# Patient Record
Sex: MALE | Race: WHITE | NOT HISPANIC OR LATINO | Employment: OTHER | URBAN - METROPOLITAN AREA
[De-identification: names, ages, dates, MRNs, and addresses within clinical notes are randomized per-mention and may not be internally consistent; named-entity substitution may affect disease eponyms.]

---

## 2017-01-30 ENCOUNTER — TELEPHONE (OUTPATIENT)
Dept: FAMILY MEDICINE | Facility: CLINIC | Age: 77
End: 2017-01-30

## 2017-01-30 NOTE — TELEPHONE ENCOUNTER
Reason for call:  Patient reporting a symptom    Symptom or request: Chest pressure    Duration (how long have symptoms been present): 2 or 3 days    Have you been treated for this before? No    Additional comments: Call routed to RN for triaging.    Phone Number patient can be reached at:  Home number on file [phone] (home)    Best Time:  na    Can we leave a detailed message on this number:  Not Applicable    Call taken on 1/30/2017 at 12:48 PM by Yas Bentley

## 2017-01-30 NOTE — TELEPHONE ENCOUNTER
Kilo Joya is a 77 year old male who calls with chest pain/pressure.   Denies cough, denies fever, denies SOB.  Low energy and has some swelling in legs but this is per baseline, patient does wear compression stockings.      NURSING ASSESSMENT:  Description:  Chest pain   Onset/duration:   2-3 days   Precip. factors:   Associated symptoms:  More tired- patient states he feels sick but is not couging    Improves/worsens symptoms:  no  Pain scale (0-10)  Ache   Last exam/Treatment:  3/8/16  Allergies:   Allergies   Allergen Reactions     Sulfa Drugs Hives and Swelling       MEDICATIONS:   Taking medication(s) as prescribed? Yes  Taking over the counter medication(s?) No  Any medication side effects? No significant side effects    Any barriers to taking medication(s) as prescribed?  No  Medication(s) improving/managing symptoms?  No  Medication reconciliation completed: Yes      NURSING PLAN: Nursing advice to patient come to clinic for appointment     RECOMMENDED DISPOSITION:  See in 24 hours - patient scheduled for 1/31/17 with pcp   Will comply with recommendation: Yes  If further questions/concerns or if symptoms do not improve, worsen or new symptoms develop, call your PCP or [facility] as soon as possible.      Guideline used:  Telephone Triage Protocols for Nurses, Fourth Edition, Dianne Enriquez RN

## 2017-01-31 ENCOUNTER — OFFICE VISIT (OUTPATIENT)
Dept: FAMILY MEDICINE | Facility: CLINIC | Age: 77
End: 2017-01-31
Payer: MEDICARE

## 2017-01-31 VITALS
DIASTOLIC BLOOD PRESSURE: 68 MMHG | HEART RATE: 82 BPM | RESPIRATION RATE: 16 BRPM | TEMPERATURE: 98 F | OXYGEN SATURATION: 99 % | SYSTOLIC BLOOD PRESSURE: 106 MMHG | BODY MASS INDEX: 27.62 KG/M2 | HEIGHT: 67 IN | WEIGHT: 176 LBS

## 2017-01-31 DIAGNOSIS — Z23 NEED FOR PROPHYLACTIC VACCINATION WITH TETANUS-DIPHTHERIA (TD): ICD-10-CM

## 2017-01-31 DIAGNOSIS — Z23 NEED FOR PROPHYLACTIC VACCINATION AGAINST STREPTOCOCCUS PNEUMONIAE (PNEUMOCOCCUS): ICD-10-CM

## 2017-01-31 DIAGNOSIS — Z23 NEED FOR PROPHYLACTIC VACCINATION AND INOCULATION AGAINST INFLUENZA: ICD-10-CM

## 2017-01-31 DIAGNOSIS — E78.5 HYPERLIPIDEMIA WITH TARGET LDL LESS THAN 100: ICD-10-CM

## 2017-01-31 DIAGNOSIS — Z91.89 FRAMINGHAM CARDIAC RISK 10-20% IN NEXT 10 YEARS: ICD-10-CM

## 2017-01-31 DIAGNOSIS — R07.89 ATYPICAL CHEST PAIN: Primary | ICD-10-CM

## 2017-01-31 PROCEDURE — 90471 IMMUNIZATION ADMIN: CPT | Performed by: INTERNAL MEDICINE

## 2017-01-31 PROCEDURE — 99214 OFFICE O/P EST MOD 30 MIN: CPT | Mod: 25 | Performed by: INTERNAL MEDICINE

## 2017-01-31 PROCEDURE — 93000 ELECTROCARDIOGRAM COMPLETE: CPT | Performed by: INTERNAL MEDICINE

## 2017-01-31 PROCEDURE — G0009 ADMIN PNEUMOCOCCAL VACCINE: HCPCS | Mod: 59 | Performed by: INTERNAL MEDICINE

## 2017-01-31 PROCEDURE — 90715 TDAP VACCINE 7 YRS/> IM: CPT | Mod: GY | Performed by: INTERNAL MEDICINE

## 2017-01-31 PROCEDURE — 90670 PCV13 VACCINE IM: CPT | Performed by: INTERNAL MEDICINE

## 2017-01-31 ASSESSMENT — PAIN SCALES - GENERAL: PAINLEVEL: MILD PAIN (2)

## 2017-01-31 NOTE — PROGRESS NOTES
SUBJECTIVE:                                                    Kilo Joya is a 77 year old male who presents to clinic today for the following health issues:       Chest pain  Need for prophylactic vaccination and inoculation against influenza  Need for prophylactic vaccination against Streptococcus pneumoniae (pneumococcus)  Need for prophylactic vaccination with tetanus-diphtheria (TD)     Mr. Kilo Joya is a patient I met once before. He's a very pleasant gentleman here with his spouse who is fighting a cancer and treatment and they are not able to travel to [place] for this winter as they have in years past. Patient has had roughly 10 days of a smouldering chest tightness which is a constant pain / chest pressure  ,no relationship to physical activity and no anginal features such as arms jaw, back or neck symptoms. No shortness of breath or clamminess and sweatiness . His symptoms are centered in the epigastrium and are strongly suggestive of gastrointestinal-mediated non-cardiac chest pain. To top it all off, he concedes today that he had been forgetting to take his ranitidine [Zantac] and got back onto this medication over the last few days and his chest pain symptoms are now subsiding.    GERD/Heartburn      Duration: 1 week    Description (location/character/radiation): epigastric    Intensity:  moderate    Accompanying signs and symptoms:  food getting stuck: no   nausea/vomiting/blood: no   abdominal pain: no   black/tarry or bloody stools: no : green stools    History (similar episodes/previous evaluation): throat pain    Precipitating or alleviating factors:  worse with no particular food or drink.  current NSAID/Aspirin use: YES- 81 mg    Therapies tried and outcome: Zantac (Ranitidine)     He has a high framingham risk index score and was treated for this with atorvastatin (Lipitor) but then stopped this medication because of what he heard from his friends about atorvastatin (Lipitor)  having caused diabetes mellitus. We reviewed the mechanism of action of the statin medications and the fact that there is absolutely zero evidence that diabetes mellitus is caused by statin medications, nevertheless he is now intentionally not taking his statin medication despite my counseling him as to the fact that there is on the balance, evidence suggesting this medication will reduce his risk of coronary artery disease in a meaningful fashion.  Also a baby aspirin he is taking.    Saturday - Sunday, terrible fatigue, taking 2-3 hour naps each day for 5 days , there was roughly a weak of sore throat, substernal chest pain - was there,  Came along with the sore throat, had skipped his usual ranitidine [Zantac] so after taking this over the last  2 days his sore throat and chest pain seems improved.  Maybe the fatigue is as long as 3 weeks ? He's been taking some vitamin C , and gargling with warm salt water   His chest pain was a constant pain for 2 solid days and did not have anginal features like worse with activity, relieved with rest .    The 10-year ASCVD risk score (McCausland MT Jr., et al., 2013) is: 19.6%    Values used to calculate the score:      Age: 77 years      Sex: Male      Is an : No      Diabetic: No      Tobacco smoker: No      Systolic Blood Pressure: 106 mmHg      Prescribed Antihypertensives: No      HDL Cholesterol: 75 mg/dL      Total Cholesterol: 243 mg/dL      Problem list and histories reviewed & adjusted, as indicated.  Additional history: as documented    Patient Active Problem List   Diagnosis     Hyperlipidemia with target LDL less than 100     Scrotal pain     Advanced directives, counseling/discussion     CKD (chronic kidney disease) stage 3, GFR 30-59 ml/min     Chronic lymphocytic leukemia (H)     Xerosis cutis     Actinic keratoses     Elevated coronary artery calcium score     [place] cardiac risk 10-20% in next 10 years     Past Surgical History   Procedure  Laterality Date     Carpal tunnel release rt/lt       left hand       Social History   Substance Use Topics     Smoking status: Never Smoker      Smokeless tobacco: Never Used     Alcohol Use: Yes      Comment: rarely     Family History   Problem Relation Age of Onset     CANCER Brother      blood cancer     Liver Disease Father      Liver Disease Paternal Aunt      as well as a paternal cousin         Current Outpatient Prescriptions   Medication Sig Dispense Refill     aspirin 81 MG tablet Take by mouth daily       Ranitidine HCl (ZANTAC PO)        Glucosamine Sulfate 500 MG TABS Take 500 mg by mouth       Omega-3 Fatty Acids (OMEGA-3 FISH OIL PO)        Multiple Vitamins-Minerals (MULTIVITAMIN OR) Take 1 tablet by mouth daily.       Allergies   Allergen Reactions     Sulfa Drugs Hives and Swelling     Recent Labs   Lab Test  11/28/16   1036  12/02/15   1035 02/27/15   05/14/12   1415  05/03/12   0905   11/16/11   0843   A1C   --    --   6.0   --    --    --    --    --    LDL  159*   --   144   --    --    --    --   136*   HDL  75   --   70   --    --    --    --   56   TRIG  44   --   58   --    --    --    --   42   ALT   --    --   29   --   10  16   --   8   CR  1.44*  1.28*  1.30   < >  1.22  1.37*   < >  1.33*   GFRESTIMATED  48*  55*  54   < >  58*  51*   < >  53*   GFRESTBLACK  58*  66  65   < >  71  62   < >  64   POTASSIUM  4.3  4.2  5.2   < >  4.3  4.3   < >  4.9   TSH   --    --   1.366   --    --   1.50   --    --     < > = values in this interval not displayed.      BP Readings from Last 3 Encounters:   01/31/17 106/68   11/28/16 124/74   03/08/16 109/68    Wt Readings from Last 3 Encounters:   01/31/17 176 lb (79.833 kg)   11/28/16 181 lb (82.101 kg)   03/08/16 173 lb (78.472 kg)                  Labs reviewed in EPIC  Problem list, Medication list, Allergies, and Medical/Social/Surgical histories reviewed in [facility] and updated as appropriate.    ROS:  Constitutional, HEENT, cardiovascular,  "pulmonary, gi and gu systems are negative, except as otherwise noted.    OBJECTIVE:                                                    /68 mmHg  Pulse 82  Temp(Src) 98  F (36.7  C) (Oral)  Resp 16  Ht 5' 7\" (1.702 m)  Wt 176 lb (79.833 kg)  BMI 27.56 kg/m2  SpO2 99%  Body mass index is 27.56 kg/(m^2).  GENERAL APPEARANCE: healthy, alert and no distress  EYES: Eyes grossly normal to inspection, PERRL and conjunctivae and sclerae normal  RESP: lungs clear to auscultation - no rales, rhonchi or wheezes  CV: regular rates and rhythm, normal S1 S2, no S3 or S4 and no murmur, click or rub  ABDOMEN: soft, nontender, without hepatosplenomegaly or masses and bowel sounds normal, slightly tender in the epigastrium without involuntary guarding or rebound tenderness     Diagnostic test results:  Diagnostic Test Results:  Orders Placed This Encounter   Procedures     PNEUMOCOCCAL CONJ VACCINE 13 VALENT IM (PREVNAR 13)     TDAP (ADACEL AGES 11-64)     EKG 12-lead complete w/read - Clinics          ASSESSMENT/PLAN:                                                    1. Chest pain  Reviewed an electrocardiogram and this is completely within normal limits. No evidence of coronary artery disease at this point based on these symptoms. A follow up cardiac stress test or cardiology consultation is not indicated by this evidence but could be pursued if he has persistent or changing symptoms. We discussed what to be on the watch for, update me if significant changes have taken place   - EKG 12-lead complete w/read - Clinics    2. Need for prophylactic vaccination and inoculation against influenza      3. Need for prophylactic vaccination against Streptococcus pneumoniae (pneumococcus)  Administered   - PNEUMOCOCCAL CONJ VACCINE 13 VALENT IM (PREVNAR 13)    4. Need for prophylactic vaccination with tetanus-diphtheria (TD)  Administered   - TDAP (ADACEL AGES 11-64)    5. Hyperlipidemia with target LDL less than 100  Treatment is " declined    6. [place] cardiac risk 10-20% in next 10 years  Concepts reviewed . He's going to consider his position and we may revisit this issue down the road       Follow up with Provider - 3-6 months      Cali Goldstein MD  [facility]    25 minutes was spent with the patient with greater than 50% in face-to-face discussion of disease process, treatment options and medicine management.

## 2017-01-31 NOTE — PATIENT INSTRUCTIONS
[facility]    If you have any questions regarding to your visit please contact your care team:     Team Pink:   Clinic Hours Telephone Number   Internal Medicine:  Dr. Wendy George NP       7am-7pm  Monday - Thursday   7am-5pm  Fridays  [phone]  (Appointment scheduling available 24/7)    Questions about your visit?  Team Line  [phone]   Urgent Care - [place] and [facility] - 11am-9pm Monday-Friday Saturday-Sunday- 9am-5pm   [place] - 5pm-9pm Monday-Friday Saturday-Sunday- 9am-5pm  [phone] - [place]   [phone] - [place]       What options do I have for visits at the clinic other than the traditional office visit?  To expand how we care for you, many of our providers are utilizing electronic visits (e-visits) and telephone visits, when medically appropriate, for interactions with their patients rather than a visit in the clinic.   We also offer nurse visits for many medical concerns. Just like any other service, we will bill your insurance company for this type of visit based on time spent on the phone with your provider. Not all insurance companies cover these visits. Please check with your medical insurance if this type of visit is covered. You will be responsible for any charges that are not paid by your insurance.      E-visits via Paxer:  generally incur a $35.00 fee.  Telephone visits:  Time spent on the phone: *charged based on time that is spent on the phone in increments of 10 minutes. Estimated cost:   5-10 mins $30.00   11-20 mins. $59.00   21-30 mins. $85.00   Use Albumatict (secure email communication and access to your chart) to send your primary care provider a message or make an appointment. Ask someone on your Team how to sign up for Paxer.    For a Price Quote for your services, please call our Consumer Price Line at [phone].    As always, Thank you for trusting us with your health care  needs!    Discharged by Kalpana MAURICE CMA ([facility])

## 2017-01-31 NOTE — NURSING NOTE
"Chief Complaint   Patient presents with     Chest Pain       Initial /68 mmHg  Pulse 82  Temp(Src) 98  F (36.7  C) (Oral)  Resp 16  Ht 5' 7\" (1.702 m)  Wt 176 lb (79.833 kg)  BMI 27.56 kg/m2  SpO2 99% Estimated body mass index is 27.56 kg/(m^2) as calculated from the following:    Height as of this encounter: 5' 7\" (1.702 m).    Weight as of this encounter: 176 lb (79.833 kg).  BP completed using cuff size: vikram Thomas CMA      "

## 2017-01-31 NOTE — MR AVS SNAPSHOT
After Visit Summary   1/31/2017    Kilo Joya    MRN: [id number]           Patient Information     Date Of Birth          1940        Visit Information        Provider Department      1/31/2017 11:30 AM Cali Goldstein MD [facility]        Today's Diagnoses     Chest pain    -  1     Need for prophylactic vaccination and inoculation against influenza         Need for prophylactic vaccination against Streptococcus pneumoniae (pneumococcus)         Need for prophylactic vaccination with tetanus-diphtheria (TD)           Care Instructions    [facility]    If you have any questions regarding to your visit please contact your care team:     Team Pink:   Clinic Hours Telephone Number   Internal Medicine:  Dr. Wendy George NP       7am-7pm  Monday - Thursday   7am-5pm  Fridays  [phone]  (Appointment scheduling available 24/7)    Questions about your visit?  Team Line  [phone]   [facility] [place] [facility] - 11am-9pm Monday-Friday Saturday-Sunday- 9am-5pm   [place] - 5pm-9pm Monday-Friday Saturday-Sunday- 9am-5pm  [phone] - [place]   [phone] - [place]       What options do I have for visits at the clinic other than the traditional office visit?  To expand how we care for you, many of our providers are utilizing electronic visits (e-visits) and telephone visits, when medically appropriate, for interactions with their patients rather than a visit in the clinic.   We also offer nurse visits for many medical concerns. Just like any other service, we will bill your insurance company for this type of visit based on time spent on the phone with your provider. Not all insurance companies cover these visits. Please check with your medical insurance if this type of visit is covered. You will be responsible for any charges that are not paid by your insurance.      E-visits via [place]:   generally incur a $35.00 fee.  Telephone visits:  Time spent on the phone: *charged based on time that is spent on the phone in increments of 10 minutes. Estimated cost:   5-10 mins $30.00   11-20 mins. $59.00   21-30 mins. $85.00   Use Visuuhart (secure email communication and access to your chart) to send your primary care provider a message or make an appointment. Ask someone on your Team how to sign up for Imagekindt.    For a Price Quote for your services, please call our [place] Price Line at [phone].    As always, Thank you for trusting us with your health care needs!    Discharged by Kalpana MAURICE CMA ([facility])          Follow-ups after your visit        Your next 10 appointments already scheduled     Nov 28, 2017 10:30 AM   LAB with LAB FIRST FLOOR [facility] ([facility])    [address] [place] [id number] [phone]           Patient must bring picture ID.  Patient should be prepared to give a urine specimen  Please do not eat 10-12 hours before your appointment if you are coming in fasting for labs on lipids, cholesterol, or glucose (sugar).  Pregnant women should follow their Care Team instructions. Water with medications is okay. Do not drink coffee or other fluids.   If you have concerns about taking  your medications, please ask at office or if scheduling via Visuuhart, send a message by clicking on Secure Messaging, Message Your Care Team.            Nov 28, 2017 11:00 AM   [facility] Visit with Anajna Carpio MD   [facility]    [address] [place] [id number] [phone]              Who to contact     If you have questions or need follow up information about today's clinic visit or your schedule please contact [facility] [place] directly at [phone].  Normal or non-critical lab and imaging results will be communicated to you by MyChart, letter or phone within 4  "business days after the clinic has received the results. If you do not hear from us within 7 days, please contact the clinic through Cloud Direct or phone. If you have a critical or abnormal lab result, we will notify you by phone as soon as possible.  Submit refill requests through Cloud Direct or call your pharmacy and they will forward the refill request to us. Please allow 3 business days for your refill to be completed.          Additional Information About Your Visit        Cloud Direct Information     Cloud Direct lets you send messages to your doctor, view your test results, renew your prescriptions, schedule appointments and more. To sign up, go to [email or web address] . Click on \"Log in\" on the left side of the screen, which will take you to the Welcome page. Then click on \"Sign up Now\" on the right side of the page.     You will be asked to enter the access code listed below, as well as some personal information. Please follow the directions to create your username and password.     Your access code is: [email or web address]  Expires: 2017 12:11 PM     Your access code will  in 90 days. If you need help or a new code, please call your [place] clinic or [phone].        Care EveryWhere ID     This is your Care EveryWhere ID. This could be used by other organizations to access your [place] medical records  [id number]        Your Vitals Were     Pulse Temperature Respirations Height BMI (Body Mass Index) Pulse Oximetry    82 98  F (36.7  C) (Oral) 16 5' 7\" (1.702 m) 27.56 kg/m2 99%       Blood Pressure from Last 3 Encounters:   17 106/68   16 124/74   16 109/68    Weight from Last 3 Encounters:   17 176 lb (79.833 kg)   16 181 lb (82.101 kg)   16 173 lb (78.472 kg)              We Performed the Following     EKG 12-lead complete w/read - Clinics     PNEUMOCOCCAL CONJ VACCINE 13 VALENT IM (PREVNAR 13)     TDAP (ADACEL AGES 11-64)        Primary Care Provider Office Phone # Fax # "    Cali Goldstein [phone]       [address]  [place] [address]        Thank you!     Thank you for choosing [facility]  for your care. Our goal is always to provide you with excellent care. Hearing back from our patients is one way we can continue to improve our services. Please take a few minutes to complete the written survey that you may receive in the mail after your visit with us. Thank you!             Your Updated Medication List - Protect others around you: Learn how to safely use, store and throw away your medicines at www.disposemymeds.org.          This list is accurate as of: 1/31/17 12:25 PM.  Always use your most recent med list.                   Brand Name Dispense Instructions for use    aspirin 81 MG tablet      Take by mouth daily       Glucosamine Sulfate 500 MG Tabs      Take 500 mg by mouth       MULTIVITAMIN PO      Take 1 tablet by mouth daily.       OMEGA-3 FISH OIL PO          ZANTAC PO

## 2017-01-31 NOTE — NURSING NOTE
Screening Questionnaire for Adult Immunization    Are you sick today?   No   Do you have allergies to medications, food, a vaccine component or latex?   No   Have you ever had a serious reaction after receiving a vaccination?   No   Do you have a long-term health problem with heart disease, lung disease, asthma, kidney disease, metabolic disease (e.g. diabetes), anemia, or other blood disorder?   No   Do you have cancer, leukemia, HIV/AIDS, or any other immune system problem?   No   In the past 3 months, have you taken medications that affect  your immune system, such as prednisone, other steroids, or anticancer drugs; drugs for the treatment of rheumatoid arthritis, Crohn s disease, or psoriasis; or have you had radiation treatments?   No   Have you had a seizure, or a brain or other nervous system problem?   No   During the past year, have you received a transfusion of blood or blood     products, or been given immune (gamma) globulin or antiviral drug?   No   For women: Are you pregnant or is there a chance you could become        pregnant during the next month?   No   Have you received any vaccinations in the past 4 weeks?   No     Immunization questionnaire answers were all negative.      MNVFC doesn't apply on this patient    Per orders of Dr. Goldstein, injection of Prevnar 13, TDAP given by Kalpana Monroe. Patient instructed to remain in clinic for 20 minutes afterwards, and to report any adverse reaction to me immediately.       Screening performed by Kalpana Monroe on 1/31/2017 at 12:38 PM.

## 2017-05-30 ENCOUNTER — TELEPHONE (OUTPATIENT)
Dept: FAMILY MEDICINE | Facility: CLINIC | Age: 77
End: 2017-05-30

## 2017-05-30 NOTE — TELEPHONE ENCOUNTER
Reason for Call:  Other call back    Detailed comments:  Patient calling. He is having issues with his feet. Do you want to see him or refer him to see a podiatrist? Please call and let know.     Phone Number Patient can be reached at: Home number on file [phone] (home)    Best Time:  Any     Can we leave a detailed message on this number? YES    Call taken on 5/30/2017 at 11:49 AM by Ellie Chaney

## 2017-05-30 NOTE — TELEPHONE ENCOUNTER
Attempted to call patient but no answer.  No VM available. Unable to leave VM  Will try again another time    Left message on mobile voicemail for patient to return call to RN hotline at # [phone].    Raymond Mckeon RN

## 2017-05-31 NOTE — TELEPHONE ENCOUNTER
Spoke with patient he has a visit scheduled with PCP on 6/2/17 for burning in his feet.  Patient reports having burning feeling in feet after walking.  This has been happening for a year on and off.  Patient states his lower legs are swollen and his oncologist ordered imaging to check circulation, per patient this was normal.  Patient spoke with oncologist who thinks patient may need to see neurology or PCP for burning in feet.  Patient wondering if he needs visit with Neurology?  Please advise   Maritza Enriquez RN

## 2017-05-31 NOTE — TELEPHONE ENCOUNTER
Noted that patient has an appointment to see Dr. Goldstein on 6/2/17 for feet issues.    Left message on voicemail for patient to return call to RN hotline at # [phone] to discuss his symptoms or he can keep his appointment and discuss further in the office    Raymond Mckeon RN

## 2017-06-01 NOTE — TELEPHONE ENCOUNTER
So far from what I am hearing here, these symptoms seem suspicious for peripheral neuropathy . I don't know that for positive . It could be other things. Typically we see someone first and then if appropriate we often refer to a neurologist. But not always. Sometimes we try treatment with medication, etc.    If patient wishes we could try with neurologist but I would also be glad to see him for starters.    Cali Goldstein MD

## 2017-06-01 NOTE — PROGRESS NOTES
SUBJECTIVE:                                                    Kilo Joya is a 77-year-old male who presents to clinic today for the following health issues:    Joint Pain        Peripheral polyneuropathy (H)  CKD (chronic kidney disease) stage 3, GFR 30-59 ml/min  Hyperlipidemia with target LDL less than 100  Chronic lymphocytic leukemia (H)  Screening for thyroid disorder     The patient comes in complaining of intermittent foot swelling over the last 3 years. But there's some newer features . He finds that over the last year, he is unable to mow his lawn as his feet will hurt. Exertion makes his pain increasingly painful with activities such as walking around the grocery store. The pain is located in his heels and the soles of his feet. The pain is burning in character and does not radiate to his ankles, or his toes. His wife saw he was toe walking the other week as a reaction to the foot pain. The patient reports he has had plantar fasciitis previously, and this is different pain.      Had tests done to search for a blood clot that was negative.The patient has bought a stationary bike recently that he is planning to begin using soon. His wife asks if the bike is a hazard to his knees, as they have a history of being painful. He has also had what sounds like ankle brachial index tests because he's really describing sequential blood pressure measurements and these tests were said to be normal. There is no classic soreness with stepping down first thing in the morning and his symptoms are more of a dysesthesia     Problem list and histories reviewed & adjusted, as indicated.  Additional history: as documented    Patient Active Problem List   Diagnosis     Hyperlipidemia with target LDL less than 100     Scrotal pain     Advanced directives, counseling/discussion     CKD (chronic kidney disease) stage 3, GFR 30-59 ml/min     Chronic lymphocytic leukemia (H)     Xerosis cutis     Actinic keratoses      Elevated coronary artery calcium score     [place] cardiac risk 10-20% in next 10 years     Peripheral polyneuropathy (H)     Past Surgical History:   Procedure Laterality Date     CARPAL TUNNEL RELEASE RT/LT      left hand       Social History   Substance Use Topics     Smoking status: Never Smoker     Smokeless tobacco: Never Used     Alcohol use Yes      Comment: rarely     Family History   Problem Relation Age of Onset     CANCER Brother      blood cancer     Liver Disease Father      Liver Disease Paternal Aunt      as well as a paternal cousin         Current Outpatient Prescriptions   Medication Sig Dispense Refill     aspirin 81 MG tablet Take by mouth daily       Ranitidine HCl (ZANTAC PO) Take by mouth as needed        Glucosamine Sulfate 500 MG TABS Take 500 mg by mouth       Omega-3 Fatty Acids (OMEGA-3 FISH OIL PO)        Multiple Vitamins-Minerals (MULTIVITAMIN OR) Take 1 tablet by mouth daily.       Labs reviewed in EPIC    Reviewed and updated as needed this visit by clinical staff    Reviewed and updated as needed this visit by Provider    ROS:  Constitutional, HEENT, cardiovascular, pulmonary, GI, , musculoskeletal, neuro, skin, endocrine and psych systems are negative, except as otherwise noted.    This document serves as a record of the services and decisions personally performed and made by Cali Goldstein MD. It was created on their behalf by Seng Baig, a trained medical scribe. The creation of this document is based the provider's statements to the medical scribe.  Seng Baig June 2, 2017 2:26 PM      OBJECTIVE:                                                    /60  Pulse 84  Temp 98.3  F (36.8  C) (Oral)  Wt 77.6 kg (171 lb)  SpO2 97%  BMI 26.78 kg/m2  Body mass index is 26.78 kg/(m^2).  GENERAL: healthy, alert and no distress  EYES: Eyes grossly normal to inspection, PERRL and conjunctivae and sclerae normal  SKIN: No visible suspicious lesions or rashes  NEURO:  Mentation intact and speech normal  PSYCH: Mentation appears normal, affect normal/bright  Foot exam: Normal ankle exams, DP pulses normal, no ulcerations. Hypoesthesia of both feet bilaterally.    Diagnostic Test Results:  No results found for this or any previous visit (from the past 24 hour(s)).     ASSESSMENT/PLAN:                                                    (G62.9) Peripheral polyneuropathy (H)  (primary encounter diagnosis)  Comment: there's a lot of evidence that points towards a peripheral neuropathy condition including his negative ultrasounds an ankle brachial index tests and lack of evidence of arthritis of plantar fasciitis. Spent a good bit of time in review of what a peripheral neuropathy is. Generally reassurance . He does not want to try Gabapentin [Neurontin] but is interested to have a laboratory workup for possible peripheral neuropathy. After this he's going to consider a follow up neurological consultation      Plan: Vitamin B12, Erythrocyte sedimentation rate         auto, CRP inflammation, Basic metabolic panel,         CBC with platelets differential            (N18.3) CKD (chronic kidney disease) stage 3, GFR 30-59 ml/min  Comment: due for laboratory studies today   Plan: Basic metabolic panel            (E78.5) Hyperlipidemia with target LDL less than 100  Comment: due for laboratory studies today   Plan: Lipid panel reflex to direct LDL            (C91.10) Chronic lymphocytic leukemia (H)  Comment: has annual follow up with [facility]   Plan: CBC with platelets differential            (Z13.29) Screening for thyroid disorder  Comment: as detailed above   Plan: TSH with free T4 reflex        And follow up as indicated       Cali Goldstein MD  [facility]    The information in this document, created by the medical scribe for me, accurately reflects the services I personally performed and the decisions made by me. I have reviewed and approved this document for  accuracy.   Cali Goldstein MD      Time in: 2:23 PM  Time out: 2:42 PM

## 2017-06-01 NOTE — TELEPHONE ENCOUNTER
Patient notified of Provider's message as written.  Patient verbalized understanding.    He will keep his appointment for tomorrow with Dr. Goldstein and discuss further then    Raymond Mckeon RN

## 2017-06-02 ENCOUNTER — OFFICE VISIT (OUTPATIENT)
Dept: FAMILY MEDICINE | Facility: CLINIC | Age: 77
End: 2017-06-02
Payer: MEDICARE

## 2017-06-02 VITALS
DIASTOLIC BLOOD PRESSURE: 60 MMHG | HEART RATE: 84 BPM | SYSTOLIC BLOOD PRESSURE: 110 MMHG | BODY MASS INDEX: 26.78 KG/M2 | TEMPERATURE: 98.3 F | OXYGEN SATURATION: 97 % | WEIGHT: 171 LBS

## 2017-06-02 DIAGNOSIS — C91.10 CHRONIC LYMPHOCYTIC LEUKEMIA (H): ICD-10-CM

## 2017-06-02 DIAGNOSIS — G62.9 PERIPHERAL POLYNEUROPATHY: Primary | ICD-10-CM

## 2017-06-02 DIAGNOSIS — E78.5 HYPERLIPIDEMIA WITH TARGET LDL LESS THAN 100: ICD-10-CM

## 2017-06-02 DIAGNOSIS — Z13.29 SCREENING FOR THYROID DISORDER: ICD-10-CM

## 2017-06-02 DIAGNOSIS — N18.30 CKD (CHRONIC KIDNEY DISEASE) STAGE 3, GFR 30-59 ML/MIN (H): ICD-10-CM

## 2017-06-02 LAB
BASOPHILS # BLD AUTO: 0.5 10E9/L (ref 0–0.2)
BASOPHILS NFR BLD AUTO: 1 %
DIFFERENTIAL METHOD BLD: ABNORMAL
EOSINOPHIL # BLD AUTO: 0.5 10E9/L (ref 0–0.7)
EOSINOPHIL NFR BLD AUTO: 1 %
ERYTHROCYTE [DISTWIDTH] IN BLOOD BY AUTOMATED COUNT: 13.3 % (ref 10–15)
ERYTHROCYTE [SEDIMENTATION RATE] IN BLOOD BY WESTERGREN METHOD: 7 MM/H (ref 0–20)
HCT VFR BLD AUTO: 38.5 % (ref 40–53)
HGB BLD-MCNC: 12.8 G/DL (ref 13.3–17.7)
LYMPHOCYTES # BLD AUTO: 40.2 10E9/L (ref 0.8–5.3)
LYMPHOCYTES NFR BLD AUTO: 86 %
MCH RBC QN AUTO: 31.6 PG (ref 26.5–33)
MCHC RBC AUTO-ENTMCNC: 33.2 G/DL (ref 31.5–36.5)
MCV RBC AUTO: 95 FL (ref 78–100)
MONOCYTES # BLD AUTO: 1.9 10E9/L (ref 0–1.3)
MONOCYTES NFR BLD AUTO: 4 %
NEUTROPHILS # BLD AUTO: 3.7 10E9/L (ref 1.6–8.3)
NEUTROPHILS NFR BLD AUTO: 8 %
PLATELET # BLD AUTO: 202 10E9/L (ref 150–450)
RBC # BLD AUTO: 4.05 10E12/L (ref 4.4–5.9)
RBC MORPH BLD: NORMAL
VARIANT LYMPHS BLD QL SMEAR: PRESENT
WBC # BLD AUTO: 46.8 10E9/L (ref 4–11)

## 2017-06-02 PROCEDURE — 82607 VITAMIN B-12: CPT | Performed by: INTERNAL MEDICINE

## 2017-06-02 PROCEDURE — 85652 RBC SED RATE AUTOMATED: CPT | Performed by: INTERNAL MEDICINE

## 2017-06-02 PROCEDURE — 80061 LIPID PANEL: CPT | Performed by: INTERNAL MEDICINE

## 2017-06-02 PROCEDURE — 86140 C-REACTIVE PROTEIN: CPT | Performed by: INTERNAL MEDICINE

## 2017-06-02 PROCEDURE — 99214 OFFICE O/P EST MOD 30 MIN: CPT | Performed by: INTERNAL MEDICINE

## 2017-06-02 PROCEDURE — 84443 ASSAY THYROID STIM HORMONE: CPT | Performed by: INTERNAL MEDICINE

## 2017-06-02 PROCEDURE — 36415 COLL VENOUS BLD VENIPUNCTURE: CPT | Performed by: INTERNAL MEDICINE

## 2017-06-02 PROCEDURE — 85025 COMPLETE CBC W/AUTO DIFF WBC: CPT | Performed by: INTERNAL MEDICINE

## 2017-06-02 PROCEDURE — 80048 BASIC METABOLIC PNL TOTAL CA: CPT | Performed by: INTERNAL MEDICINE

## 2017-06-02 ASSESSMENT — PAIN SCALES - GENERAL: PAINLEVEL: MILD PAIN (2)

## 2017-06-02 NOTE — MR AVS SNAPSHOT
After Visit Summary   6/2/2017    Kilo Joya    MRN: [id number]           Patient Information     Date Of Birth          1940        Visit Information        Provider Department      6/2/2017 2:10 PM Cali Goldstein MD [facility]        Today's Diagnoses     Peripheral polyneuropathy (H)    -  1    CKD (chronic kidney disease) stage 3, GFR 30-59 ml/min        Hyperlipidemia with target LDL less than 100        Chronic lymphocytic leukemia (H)        Screening for thyroid disorder          Care Instructions    I will follow up with you after your lab tests come back. We can discuss if you would like to start gabapentin, a neurologic pain medication at that time. Additionally if you are interested in seeing a neurologist, you can request that I make you a referral.       [facility]    If you have any questions regarding to your visit please contact your care team:     Team Pink:   Clinic Hours Telephone Number   Internal Medicine:  Dr. Wendy George NP       7am-7pm  Monday - Thursday   7am-5pm  Fridays  [phone]  (Appointment scheduling available 24/7)    Questions about your visit?  Team Line  [phone]   Urgent Care - [place] and [place] - 11am-9pm Monday-Friday Saturday-Sunday- 9am-5pm   [place] - 5pm-9pm Monday-Friday Saturday-Sunday- 9am-5pm  [phone] - [place]   [phone] - [place]       What options do I have for visits at the clinic other than the traditional office visit?  To expand how we care for you, many of our providers are utilizing electronic visits (e-visits) and telephone visits, when medically appropriate, for interactions with their patients rather than a visit in the clinic.   We also offer nurse visits for many medical concerns. Just like any other service, we will bill your insurance company for this type of visit based on time spent on the phone with your provider.  Not all insurance companies cover these visits. Please check with your medical insurance if this type of visit is covered. You will be responsible for any charges that are not paid by your insurance.      E-visits via [employer]hart:  generally incur a $35.00 fee.  Telephone visits:  Time spent on the phone: *charged based on time that is spent on the phone in increments of 10 minutes. Estimated cost:   5-10 mins $30.00   11-20 mins. $59.00   21-30 mins. $85.00   Use [employer] (secure email communication and access to your chart) to send your primary care provider a message or make an appointment. Ask someone on your Team how to sign up for [employer].    For a Price Quote for your services, please call our [place] Line at [phone].    As always, Thank you for trusting us with your health care needs!    Kalpana MAURICE CMA ([facility])            Follow-ups after your visit        Your next 10 appointments already scheduled     Nov 28, 2017 10:30 AM CST   LAB with LAB FIRST FLOOR [facility])    [address] [address] [place] [address] [phone]           Patient must bring picture ID.  Patient should be prepared to give a urine specimen  Please do not eat 10-12 hours before your appointment if you are coming in fasting for labs on lipids, cholesterol, or glucose (sugar).  Pregnant women should follow their Care Team instructions. Water with medications is okay. Do not drink coffee or other fluids.   If you have concerns about taking  your medications, please ask at office or if scheduling via [employer], send a message by clicking on Secure Messaging, Message Your Care Team.            Nov 28, 2017 11:00 AM CST   Return Visit with Anjana Carpio MD   [facility])    [address] [facility] [address] [phone]              Who to contact     If you have questions or need follow up information  "about today's clinic visit or your schedule please contact [facility] directly at [phone].  Normal or non-critical lab and imaging results will be communicated to you by [employer], letter or phone within 4 business days after the clinic has received the results. If you do not hear from us within 7 days, please contact the clinic through [employer] or phone. If you have a critical or abnormal lab result, we will notify you by phone as soon as possible.  Submit refill requests through [employer] or call your pharmacy and they will forward the refill request to us. Please allow 3 business days for your refill to be completed.          Additional Information About Your Visit        Zura!harConnectToHome Information     [employer] lets you send messages to your doctor, view your test results, renew your prescriptions, schedule appointments and more. To sign up, go to [email or web address] . Click on \"Log in\" on the left side of the screen, which will take you to the Welcome page. Then click on \"Sign up Now\" on the right side of the page.     You will be asked to enter the access code listed below, as well as some personal information. Please follow the directions to create your username and password.     Your access code is: [email or web address]  Expires: 2017  2:40 PM     Your access code will  in 90 days. If you need help or a new code, please call your [place] clinic or [phone].        Care EveryWhere ID     This is your Care EveryWhere ID. This could be used by other organizations to access your [place] medical records  [id number]        Your Vitals Were     Pulse Temperature Pulse Oximetry BMI (Body Mass Index)          84 98.3  F (36.8  C) (Oral) 97% 26.78 kg/m2         Blood Pressure from Last 3 Encounters:   17 110/60   17 106/68   16 124/74    Weight from Last 3 Encounters:   17 171 lb (77.6 kg)   17 176 lb (79.8 kg)   16 181 lb (82.1 kg)              We Performed the " Following     Basic metabolic panel     CBC with platelets differential     CRP inflammation     Erythrocyte sedimentation rate auto     Lipid panel reflex to direct LDL     TSH with free T4 reflex     Vitamin B12        Primary Care Provider Office Phone # Fax #    Cali Goldstein [phone]       [address]  [place] [address]        Thank you!     Thank you for choosing [facility]  for your care. Our goal is always to provide you with excellent care. Hearing back from our patients is one way we can continue to improve our services. Please take a few minutes to complete the written survey that you may receive in the mail after your visit with us. Thank you!             Your Updated Medication List - Protect others around you: Learn how to safely use, store and throw away your medicines at www.disposemymeds.org.          This list is accurate as of: 6/2/17  2:40 PM.  Always use your most recent med list.                   Brand Name Dispense Instructions for use    aspirin 81 MG tablet      Take by mouth daily       Glucosamine Sulfate 500 MG Tabs      Take 500 mg by mouth       MULTIVITAMIN PO      Take 1 tablet by mouth daily.       OMEGA-3 FISH OIL PO          ZANTAC PO      Take by mouth as needed

## 2017-06-02 NOTE — PATIENT INSTRUCTIONS
I will follow up with you after your lab tests come back. We can discuss if you would like to start gabapentin, a neurologic pain medication at that time. Additionally if you are interested in seeing a neurologist, you can request that I make you a referral.       [facility]    If you have any questions regarding to your visit please contact your care team:     Team Pink:   Clinic Hours Telephone Number   Internal Medicine:  Dr. Wendy George, NP       7am-7pm  Monday - Thursday   7am-5pm  Fridays  [phone]  (Appointment scheduling available 24/7)    Questions about your visit?  Team Line  [phone]   Urgent Care - [place] and [place] - 11am-9pm Monday-Friday Saturday-Sunday- 9am-5pm   [place] - 5pm-9pm Monday-Friday Saturday-Sunday- 9am-5pm  [phone] - [place]   [phone] - [place]       What options do I have for visits at the clinic other than the traditional office visit?  To expand how we care for you, many of our providers are utilizing electronic visits (e-visits) and telephone visits, when medically appropriate, for interactions with their patients rather than a visit in the clinic.   We also offer nurse visits for many medical concerns. Just like any other service, we will bill your insurance company for this type of visit based on time spent on the phone with your provider. Not all insurance companies cover these visits. Please check with your medical insurance if this type of visit is covered. You will be responsible for any charges that are not paid by your insurance.      E-visits via [employer]:  generally incur a $35.00 fee.  Telephone visits:  Time spent on the phone: *charged based on time that is spent on the phone in increments of 10 minutes. Estimated cost:   5-10 mins $30.00   11-20 mins. $59.00   21-30 mins. $85.00   Use [employer] (secure email communication and access to your chart) to send your primary care  provider a message or make an appointment. Ask someone on your Team how to sign up for [employer].    For a Price Quote for your services, please call our Consumer Price Line at [phone].    As always, Thank you for trusting us with your health care needs!    Kalpana MAURICE CMA ([facility])

## 2017-06-02 NOTE — NURSING NOTE
"Chief Complaint   Patient presents with     Musculoskeletal Problem     Feet x 2 months Burning,Pain and heals of feet     Balance/ Vestibular       Initial /60  Pulse 84  Temp 98.3  F (36.8  C) (Oral)  Wt 171 lb (77.6 kg)  SpO2 97%  BMI 26.78 kg/m2 Estimated body mass index is 26.78 kg/(m^2) as calculated from the following:    Height as of 1/31/17: 5' 7\" (1.702 m).    Weight as of this encounter: 171 lb (77.6 kg).  Medication Reconciliation: complete   ZULEMA/MA      "

## 2017-06-02 NOTE — LETTER
[address] [place] [address]    June 6, 2017    Kilo Joya  [address] [place] [address] [address]  [facility] [address]          Dear Esteban Boateng is a copy of your results. Every single one of these tests shows us no new findings. These laboratory studies are basically stable, and without significant change from prior measurements.  Naomi Zavala    Results for orders placed or performed in visit on 06/02/17   Vitamin B12   Result Value Ref Range    Vitamin B12 353 193 - 986 pg/mL   Erythrocyte sedimentation rate auto   Result Value Ref Range    Sed Rate 7 0 - 20 mm/h   CRP inflammation   Result Value Ref Range    CRP Inflammation <2.9 0.0 - 8.0 mg/L   TSH with free T4 reflex   Result Value Ref Range    TSH 1.14 0.40 - 4.00 mU/L   Basic metabolic panel   Result Value Ref Range    Sodium 143 133 - 144 mmol/L    Potassium 4.2 3.4 - 5.3 mmol/L    Chloride 108 94 - 109 mmol/L    Carbon Dioxide 25 20 - 32 mmol/L    Anion Gap 10 3 - 14 mmol/L    Glucose 104 (H) 70 - 99 mg/dL    Urea Nitrogen 32 (H) 7 - 30 mg/dL    Creatinine 1.44 (H) 0.66 - 1.25 mg/dL    GFR Estimate 48 (L) >60 mL/min/1.7m2    GFR Estimate If Black 58 (L) >60 mL/min/1.7m2    Calcium 8.9 8.5 - 10.1 mg/dL   CBC with platelets differential   Result Value Ref Range    WBC 46.8 (H) 4.0 - 11.0 10e9/L    RBC Count 4.05 (L) 4.4 - 5.9 10e12/L    Hemoglobin 12.8 (L) 13.3 - 17.7 g/dL    Hematocrit 38.5 (L) 40.0 - 53.0 %    MCV 95 78 - 100 fl    MCH 31.6 26.5 - 33.0 pg    MCHC 33.2 31.5 - 36.5 g/dL    RDW 13.3 10.0 - 15.0 %    Platelet Count 202 150 - 450 10e9/L    % Neutrophils 8.0 %    % Lymphocytes 86.0 %    % Monocytes 4.0 %    % Eosinophils 1.0 %    % Basophils 1.0 %    Absolute Neutrophil 3.7 1.6 - 8.3 10e9/L    Absolute Lymphocytes 40.2 (H) 0.8 - 5.3 10e9/L    Absolute Monocytes 1.9 (H) 0.0 - 1.3 10e9/L    Absolute Eosinophils 0.5 0.0 - 0.7 10e9/L    Absolute Basophils  0.5 (H) 0.0 - 0.2 10e9/L    Reactive Lymphs Present     RBC Morphology Normal     Diff Method Manual Differential    Lipid panel reflex to direct LDL   Result Value Ref Range    Cholesterol 228 (H) <200 mg/dL    Triglycerides 81 <150 mg/dL    HDL Cholesterol 64 >39 mg/dL    LDL Cholesterol Calculated 148 (H) <100 mg/dL    Non HDL Cholesterol 164 (H) <130 mg/dL     If you have any questions or concerns, please call myself or my nurse at [phone].    Sincerely,      Cali Goldstein MD/rk

## 2017-06-05 LAB
ANION GAP SERPL CALCULATED.3IONS-SCNC: 10 MMOL/L (ref 3–14)
BUN SERPL-MCNC: 32 MG/DL (ref 7–30)
CALCIUM SERPL-MCNC: 8.9 MG/DL (ref 8.5–10.1)
CHLORIDE SERPL-SCNC: 108 MMOL/L (ref 94–109)
CHOLEST SERPL-MCNC: 228 MG/DL
CO2 SERPL-SCNC: 25 MMOL/L (ref 20–32)
CREAT SERPL-MCNC: 1.44 MG/DL (ref 0.66–1.25)
CRP SERPL-MCNC: <2.9 MG/L (ref 0–8)
GFR SERPL CREATININE-BSD FRML MDRD: 48 ML/MIN/1.7M2
GLUCOSE SERPL-MCNC: 104 MG/DL (ref 70–99)
HDLC SERPL-MCNC: 64 MG/DL
LDLC SERPL CALC-MCNC: 148 MG/DL
NONHDLC SERPL-MCNC: 164 MG/DL
POTASSIUM SERPL-SCNC: 4.2 MMOL/L (ref 3.4–5.3)
SODIUM SERPL-SCNC: 143 MMOL/L (ref 133–144)
TRIGL SERPL-MCNC: 81 MG/DL
TSH SERPL DL<=0.005 MIU/L-ACNC: 1.14 MU/L (ref 0.4–4)
VIT B12 SERPL-MCNC: 353 PG/ML (ref 193–986)

## 2017-06-27 ENCOUNTER — TELEPHONE (OUTPATIENT)
Dept: FAMILY MEDICINE | Facility: CLINIC | Age: 77
End: 2017-06-27

## 2017-06-27 DIAGNOSIS — G62.9 PERIPHERAL POLYNEUROPATHY: Primary | ICD-10-CM

## 2017-06-27 NOTE — TELEPHONE ENCOUNTER
Patient would like to see a neurologist for the neuropathy in his feet.  He stated that he was seen for this on 6/2/17  He would like DR. Goldstein to put in a referral for the neurologist or location that Dr. Goldstein would recommend  Please review and sign referral if appropriate    Per OV notes:     (G62.9) Peripheral polyneuropathy (H)  (primary encounter diagnosis)  Comment: there's a lot of evidence that points towards a peripheral neuropathy condition including his negative ultrasounds an ankle brachial index tests and lack of evidence of arthritis of plantar fasciitis. Spent a good bit of time in review of what a peripheral neuropathy is. Generally reassurance . He does not want to try Gabapentin [Neurontin] but is interested to have a laboratory workup for possible peripheral neuropathy. After this he's going to consider a follow up neurological consultation     Raymond Mckeon RN

## 2017-06-27 NOTE — TELEPHONE ENCOUNTER
Referral faxed to [facility] at [phone].  Patient called and informed. Dona Al,

## 2017-06-27 NOTE — TELEPHONE ENCOUNTER
Reason for Call:  Other call back    Detailed comments: Patient would like to discuss requesting an referral to see an specialty that specializes in neuropathy, please call to discuss further    Phone Number Patient can be reached at: Home number on file [phone] (home)    Best Time: today    Can we leave a detailed message on this number? YES    Call taken on 6/27/2017 at 11:00 AM by Freedom Patino

## 2017-07-10 ENCOUNTER — TELEPHONE (OUTPATIENT)
Dept: INTERNAL MEDICINE | Facility: CLINIC | Age: 77
End: 2017-07-10

## 2017-07-10 NOTE — TELEPHONE ENCOUNTER
[place] @ [facility] called and needs the referral, labs, imaging, and notes for appointment scheduled on 7/26/17.  Fax # [phone]

## 2017-07-11 ENCOUNTER — TELEPHONE (OUTPATIENT)
Dept: FAMILY MEDICINE | Facility: CLINIC | Age: 77
End: 2017-07-11

## 2017-07-11 NOTE — TELEPHONE ENCOUNTER
Reason for Call:  Other call back    Detailed comments: Caller states to disregard message.    Phone Number Patient can be reached at: Other phone number:  [phone]    Best Time: na    Can we leave a detailed message on this number? Not Applicable    Call taken on 7/11/2017 at 11:06 AM by Yas Bentley

## 2017-07-26 ENCOUNTER — TRANSFERRED RECORDS (OUTPATIENT)
Dept: HEALTH INFORMATION MANAGEMENT | Facility: CLINIC | Age: 77
End: 2017-07-26

## 2017-10-26 DIAGNOSIS — N18.30 CKD (CHRONIC KIDNEY DISEASE) STAGE 3, GFR 30-59 ML/MIN (H): Primary | ICD-10-CM

## 2017-11-28 ENCOUNTER — OFFICE VISIT (OUTPATIENT)
Dept: NEPHROLOGY | Facility: CLINIC | Age: 77
End: 2017-11-28
Payer: MEDICARE

## 2017-11-28 VITALS
OXYGEN SATURATION: 98 % | DIASTOLIC BLOOD PRESSURE: 73 MMHG | WEIGHT: 174.4 LBS | HEART RATE: 63 BPM | BODY MASS INDEX: 27.31 KG/M2 | SYSTOLIC BLOOD PRESSURE: 136 MMHG

## 2017-11-28 DIAGNOSIS — C91.10 CHRONIC LYMPHOCYTIC LEUKEMIA (H): ICD-10-CM

## 2017-11-28 DIAGNOSIS — N18.30 CKD (CHRONIC KIDNEY DISEASE) STAGE 3, GFR 30-59 ML/MIN (H): ICD-10-CM

## 2017-11-28 DIAGNOSIS — N18.30 CKD (CHRONIC KIDNEY DISEASE) STAGE 3, GFR 30-59 ML/MIN (H): Primary | ICD-10-CM

## 2017-11-28 DIAGNOSIS — D64.9 ANEMIA, UNSPECIFIED TYPE: ICD-10-CM

## 2017-11-28 LAB
ALBUMIN SERPL-MCNC: 3.3 G/DL (ref 3.4–5)
ANION GAP SERPL CALCULATED.3IONS-SCNC: 4 MMOL/L (ref 3–14)
BUN SERPL-MCNC: 30 MG/DL (ref 7–30)
CALCIUM SERPL-MCNC: 8.6 MG/DL (ref 8.5–10.1)
CHLORIDE SERPL-SCNC: 104 MMOL/L (ref 94–109)
CO2 SERPL-SCNC: 29 MMOL/L (ref 20–32)
CREAT SERPL-MCNC: 1.43 MG/DL (ref 0.66–1.25)
CREAT UR-MCNC: 60 MG/DL
GFR SERPL CREATININE-BSD FRML MDRD: 48 ML/MIN/1.7M2
GLUCOSE SERPL-MCNC: 86 MG/DL (ref 70–99)
HGB BLD-MCNC: 12.5 G/DL (ref 13.3–17.7)
PHOSPHATE SERPL-MCNC: 2.9 MG/DL (ref 2.5–4.5)
POTASSIUM SERPL-SCNC: 4.5 MMOL/L (ref 3.4–5.3)
PROT UR-MCNC: 0.05 G/L
PROT/CREAT 24H UR: 0.09 G/G CR (ref 0–0.2)
PTH-INTACT SERPL-MCNC: 22 PG/ML (ref 12–72)
SODIUM SERPL-SCNC: 137 MMOL/L (ref 133–144)

## 2017-11-28 PROCEDURE — 99214 OFFICE O/P EST MOD 30 MIN: CPT | Performed by: INTERNAL MEDICINE

## 2017-11-28 PROCEDURE — 80069 RENAL FUNCTION PANEL: CPT | Performed by: INTERNAL MEDICINE

## 2017-11-28 PROCEDURE — 84156 ASSAY OF PROTEIN URINE: CPT | Performed by: INTERNAL MEDICINE

## 2017-11-28 PROCEDURE — 85018 HEMOGLOBIN: CPT | Performed by: INTERNAL MEDICINE

## 2017-11-28 PROCEDURE — 36415 COLL VENOUS BLD VENIPUNCTURE: CPT | Performed by: INTERNAL MEDICINE

## 2017-11-28 PROCEDURE — 83970 ASSAY OF PARATHORMONE: CPT | Performed by: INTERNAL MEDICINE

## 2017-11-28 NOTE — MR AVS SNAPSHOT
After Visit Summary   11/28/2017    Kilo Joya    MRN: [id number]           Patient Information     Date Of Birth          1940        Visit Information        Provider Department      11/28/2017 11:00 AM Anjana Carpio MD [facility]        Care Instructions    One year follow-up          Follow-ups after your visit        Your next 10 appointments already scheduled     Nov 27, 2018 10:30 AM CST   LAB with LAB FIRST FLOOR [facility] ([facility])    [address] [place] [id number] [phone]           Please do not eat 10-12 hours before your appointment if you are coming in fasting for labs on lipids, cholesterol, or glucose (sugar). This does not apply to pregnant women. Water, hot tea and black coffee (with nothing added) are okay. Do not drink other fluids, diet soda or chew gum.            Nov 27, 2018 11:00 AM CST   Return Visit with Anjana Carpio MD   [facility] ([facility])    [address] [place] [id number]   [phone]              Who to contact     If you have questions or need follow up information about today's clinic visit or your schedule please contact [facility] directly at [phone].  Normal or non-critical lab and imaging results will be communicated to you by MyChart, letter or phone within 4 business days after the clinic has received the results. If you do not hear from us within 7 days, please contact the clinic through MyChart or phone. If you have a critical or abnormal lab result, we will notify you by phone as soon as possible.  Submit refill requests through VMob or call your pharmacy and they will forward the refill request to us. Please allow 3 business days for your refill to be completed.          Additional Information About Your Visit        MyChart Information     Joanne is an  electronic gateway that provides easy, online access to your medical records. With Xi'an [email or web address], you can request a clinic appointment, read your test results, renew a prescription or communicate with your care team.     To sign up for [employer] [email or web address] visit the website at [email or web address]   You will be asked to enter the access code listed below, as well as some personal information. Please follow the directions to create your username and password.     Your access code is: [email or web address]  Expires: 2018 11:20 AM     Your access code will  in 90 days. If you need help or a new code, please contact your [facility] or call [phone] for assistance.        Care EveryWhere ID     This is your Care EveryWhere ID. This could be used by other organizations to access your [place] medical records  [id number]        Your Vitals Were     Pulse Pulse Oximetry BMI (Body Mass Index)             63 98% 27.31 kg/m2          Blood Pressure from Last 3 Encounters:   17 136/73   17 110/60   17 106/68    Weight from Last 3 Encounters:   17 79.1 kg (174 lb 6.4 oz)   17 77.6 kg (171 lb)   17 79.8 kg (176 lb)              Today, you had the following     No orders found for display       Primary Care Provider Office Phone # Fax #    Cali Goldstein [phone] [facility] [address]        Equal Access to Services     GORDO CHANG AH: Hadii aad ku hadasho Soomaali, waaxda luqadaha, qaybta kaalmada adeegyada, waxay oneal vines. So [facility] [phone].    ATENCIÓN: Si habla español, tiene a hoffman disposición servicios gratuitos de asistencia lingüística. Llame al [phone].    We comply with applicable federal civil rights laws and [place] laws. We do not discriminate on the basis of race, color, national origin, age, disability, sex, sexual orientation, or gender identity.            Thank you!      Thank you for choosing [facility]  for your care. Our goal is always to provide you with excellent care. Hearing back from our patients is one way we can continue to improve our services. Please take a few minutes to complete the written survey that you may receive in the mail after your visit with us. Thank you!             Your Updated Medication List - Protect others around you: Learn how to safely use, store and throw away your medicines at www.disposemymeds.org.          This list is accurate as of: 11/28/17 11:20 AM.  Always use your most recent med list.                   Brand Name Dispense Instructions for use Diagnosis    aspirin 81 MG tablet      Take by mouth daily        Glucosamine Sulfate 500 MG Tabs      Take 500 mg by mouth        MULTIVITAMIN PO      Take 1 tablet by mouth daily.    CKD (chronic kidney disease) stage 3, GFR 30-59 ml/min       OMEGA-3 FISH OIL PO

## 2017-11-28 NOTE — NURSING NOTE
"Kilo Joya's goals for this visit include: CC  He requests these members of his care team be copied on today's visit information: No    PCP: Cali Goldstein    Referring Provider:  No referring provider defined for this encounter.    Chief Complaint   Patient presents with     RECHECK       Initial There were no vitals taken for this visit. Estimated body mass index is 26.78 kg/(m^2) as calculated from the following:    Height as of 1/31/17: 1.702 m (5' 7\").    Weight as of 6/2/17: 77.6 kg (171 lb).  Medication Reconciliation: complete  "

## 2017-12-05 ENCOUNTER — TELEPHONE (OUTPATIENT)
Dept: NEPHROLOGY | Facility: CLINIC | Age: 77
End: 2017-12-05

## 2017-12-05 NOTE — TELEPHONE ENCOUNTER
[facility] Call Center    Phone Message    Name of Caller: Kilo    Phone Number: Home number on file [phone] (home)    Best time to return call: Any    May a detailed message be left on voicemail: yes    Relation to patient: Self    Reason for Call: Requesting Results   Name/type of test: Labs, Pt would like a call to discuss a well as a copy sent to his home address.   Date of test: 11/28  Was test done at a location other than [facility] (Please fill in the location if not [facility])?: No        Action Taken: Message routed to:  Adult Clinics: Nephrology p [phone]

## 2017-12-05 NOTE — TELEPHONE ENCOUNTER
Reviewed results with patient's wife over telephone. Letter appears to be in process.    Georgia Valverde, RN, BSN  Nephrology Care Coordinator  [facility]

## 2017-12-23 PROBLEM — D64.9 ANEMIA: Status: ACTIVE | Noted: 2017-12-23

## 2017-12-23 NOTE — PROGRESS NOTES
11/28/17  CC: CKD    HPI: Derick Joya is a 77 year old male who presents for follow-up of CKD.  On review of risk factors for CKD, his work did expose him to various solutions such as methanol, acetone, and various metals at times. No other significant risk factors. He is followed by Dr. Robles at [facility] for his CLL but has not had treatment - just following for now. Creatinine has been 1.44 for the past year; stable at 1.43 now. No proteinuria on last check. He wears compression stockings.      Allergies   Allergen Reactions     Sulfa Drugs Hives and Swelling         Current Outpatient Prescriptions on File Prior to Visit:  aspirin 81 MG tablet Take by mouth daily   Glucosamine Sulfate 500 MG TABS Take 500 mg by mouth   Omega-3 Fatty Acids (OMEGA-3 FISH OIL PO)    Multiple Vitamins-Minerals (MULTIVITAMIN OR) Take 1 tablet by mouth daily.     No current facility-administered medications on file prior to visit.     Past Medical History:   Diagnosis Date     Ankle fracture, right      CLL (chronic lymphocytic leukaemia)      Left knee pain     takes glucosamine     Shingles        Past Surgical History:   Procedure Laterality Date     CARPAL TUNNEL RELEASE RT/LT      left hand       Social History   Substance Use Topics     Smoking status: Never Smoker     Smokeless tobacco: Never Used     Alcohol use Yes      Comment: rarely       Family History   Problem Relation Age of Onset     CANCER Brother      blood cancer     Liver Disease Father      Liver Disease Paternal Aunt      as well as a paternal cousin       ROS: A 4 system review of systems was negative other than noted here or above.     Exam:  /73 (BP Location: Left arm, Patient Position: Chair, Cuff Size: Adult Regular)  Pulse 63  Wt 79.1 kg (174 lb 6.4 oz)  SpO2 98%  BMI 27.31 kg/m2    GENERAL APPEARANCE: alert and no distress  RESP: lungs clear to auscultation   CV: regular rhythm, normal rate, no rub  Extremities: no clubbing,  cyanosis,TEDs in place, 0-trace on the right, no edema on the left  SKIN: no rash  NEURO: mentation intact and speech normal  PSYCH: affect normal/bright    Orders Only on 11/28/2017   Component Date Value Ref Range Status     Parathyroid Hormone Intact 11/28/2017 22  12 - 72 pg/mL Final     Protein Random Urine 11/28/2017 0.05  g/L Final     Protein Total Urine g/gr Creatinine 11/28/2017 0.09  0 - 0.2 g/g Cr Final     Hemoglobin 11/28/2017 12.5* 13.3 - 17.7 g/dL Final     Sodium 11/28/2017 137  133 - 144 mmol/L Final     Potassium 11/28/2017 4.5  3.4 - 5.3 mmol/L Final     Chloride 11/28/2017 104  94 - 109 mmol/L Final     Carbon Dioxide 11/28/2017 29  20 - 32 mmol/L Final     Anion Gap 11/28/2017 4  3 - 14 mmol/L Final     Glucose 11/28/2017 86  70 - 99 mg/dL Final     Urea Nitrogen 11/28/2017 30  7 - 30 mg/dL Final     Creatinine 11/28/2017 1.43* 0.66 - 1.25 mg/dL Final     GFR Estimate 11/28/2017 48* >60 mL/min/1.7m2 Final    Non  GFR Calc     GFR Estimate If Black 11/28/2017 58* >60 mL/min/1.7m2 Final    African American GFR Calc     Calcium 11/28/2017 8.6  8.5 - 10.1 mg/dL Final     Phosphorus 11/28/2017 2.9  2.5 - 4.5 mg/dL Final     Albumin 11/28/2017 3.3* 3.4 - 5.0 g/dL Final     Creatinine Urine 11/28/2017 60  mg/dL Final          Assessment/Plan:  1. CKD Stage 3: GFR has been in the 50s for some time - cystatin C showed a GFR of 60 at the same time as a calculated GFR of 53. He does not have an obvious etiology of his CKD. He does have a hx of possible exposure to various metals in the past - unclear whether he had lead or other metal exposure causing kidney injury in the past. Regardless, he has not had any evidence of hematuria/proteinuria and his kidney function has been completely stable and thus do not feel renal biopsy is indicated. Instead, will follow to assure stability.     2. CLL: followed by Dr. Robles at [facility] - monitoring without therapy at this point in time.     3.  Anemia: hemoglobin 12.5 at this time    Patient Instructions   One year follow-up     Anjana Carpio DO

## 2018-01-15 ENCOUNTER — TELEPHONE (OUTPATIENT)
Dept: INTERNAL MEDICINE | Facility: CLINIC | Age: 78
End: 2018-01-15

## 2018-01-15 ENCOUNTER — OFFICE VISIT (OUTPATIENT)
Dept: INTERNAL MEDICINE | Facility: CLINIC | Age: 78
End: 2018-01-15
Payer: MEDICARE

## 2018-01-15 VITALS
SYSTOLIC BLOOD PRESSURE: 96 MMHG | OXYGEN SATURATION: 98 % | BODY MASS INDEX: 26.94 KG/M2 | TEMPERATURE: 98 F | HEART RATE: 102 BPM | DIASTOLIC BLOOD PRESSURE: 64 MMHG | RESPIRATION RATE: 14 BRPM | WEIGHT: 172 LBS

## 2018-01-15 DIAGNOSIS — D64.9 ANEMIA, UNSPECIFIED TYPE: ICD-10-CM

## 2018-01-15 DIAGNOSIS — R19.7 ACUTE DIARRHEA: Primary | ICD-10-CM

## 2018-01-15 LAB
ALBUMIN SERPL-MCNC: 3.3 G/DL (ref 3.4–5)
ALP SERPL-CCNC: 77 U/L (ref 40–150)
ALT SERPL W P-5'-P-CCNC: 16 U/L (ref 0–70)
ANION GAP SERPL CALCULATED.3IONS-SCNC: 7 MMOL/L (ref 3–14)
AST SERPL W P-5'-P-CCNC: 13 U/L (ref 0–45)
BILIRUB SERPL-MCNC: 0.5 MG/DL (ref 0.2–1.3)
BUN SERPL-MCNC: 26 MG/DL (ref 7–30)
CALCIUM SERPL-MCNC: 8.8 MG/DL (ref 8.5–10.1)
CHLORIDE SERPL-SCNC: 105 MMOL/L (ref 94–109)
CO2 SERPL-SCNC: 27 MMOL/L (ref 20–32)
CREAT SERPL-MCNC: 1.57 MG/DL (ref 0.66–1.25)
CRP SERPL-MCNC: 7.7 MG/L (ref 0–8)
DIFFERENTIAL METHOD BLD: ABNORMAL
EOSINOPHIL # BLD AUTO: 0.6 10E9/L (ref 0–0.7)
EOSINOPHIL NFR BLD AUTO: 1 %
ERYTHROCYTE [DISTWIDTH] IN BLOOD BY AUTOMATED COUNT: 13.1 % (ref 10–15)
ERYTHROCYTE [SEDIMENTATION RATE] IN BLOOD BY WESTERGREN METHOD: 9 MM/H (ref 0–20)
FERRITIN SERPL-MCNC: 205 NG/ML (ref 26–388)
GFR SERPL CREATININE-BSD FRML MDRD: 43 ML/MIN/1.7M2
GLUCOSE SERPL-MCNC: 90 MG/DL (ref 70–99)
HCT VFR BLD AUTO: 39.6 % (ref 40–53)
HGB BLD-MCNC: 12.9 G/DL (ref 13.3–17.7)
IRON SATN MFR SERPL: 38 % (ref 15–46)
IRON SERPL-MCNC: 80 UG/DL (ref 35–180)
LIPASE SERPL-CCNC: 86 U/L (ref 73–393)
LYMPHOCYTES # BLD AUTO: 50.4 10E9/L (ref 0.8–5.3)
LYMPHOCYTES NFR BLD AUTO: 88 %
MCH RBC QN AUTO: 31.2 PG (ref 26.5–33)
MCHC RBC AUTO-ENTMCNC: 32.6 G/DL (ref 31.5–36.5)
MCV RBC AUTO: 96 FL (ref 78–100)
MONOCYTES # BLD AUTO: 1.1 10E9/L (ref 0–1.3)
MONOCYTES NFR BLD AUTO: 2 %
NEUTROPHILS # BLD AUTO: 5.1 10E9/L (ref 1.6–8.3)
NEUTROPHILS NFR BLD AUTO: 9 %
PLATELET # BLD AUTO: 247 10E9/L (ref 150–450)
PLATELET # BLD EST: ABNORMAL 10*3/UL
POTASSIUM SERPL-SCNC: 4 MMOL/L (ref 3.4–5.3)
PROT SERPL-MCNC: 6.5 G/DL (ref 6.8–8.8)
RBC # BLD AUTO: 4.14 10E12/L (ref 4.4–5.9)
SODIUM SERPL-SCNC: 139 MMOL/L (ref 133–144)
TIBC SERPL-MCNC: 209 UG/DL (ref 240–430)
VARIANT LYMPHS BLD QL SMEAR: PRESENT
WBC # BLD AUTO: 57.2 10E9/L (ref 4–11)

## 2018-01-15 PROCEDURE — 86140 C-REACTIVE PROTEIN: CPT | Performed by: INTERNAL MEDICINE

## 2018-01-15 PROCEDURE — 83550 IRON BINDING TEST: CPT | Performed by: INTERNAL MEDICINE

## 2018-01-15 PROCEDURE — 85652 RBC SED RATE AUTOMATED: CPT | Performed by: INTERNAL MEDICINE

## 2018-01-15 PROCEDURE — 80053 COMPREHEN METABOLIC PANEL: CPT | Performed by: INTERNAL MEDICINE

## 2018-01-15 PROCEDURE — 36415 COLL VENOUS BLD VENIPUNCTURE: CPT | Performed by: INTERNAL MEDICINE

## 2018-01-15 PROCEDURE — 82728 ASSAY OF FERRITIN: CPT | Performed by: INTERNAL MEDICINE

## 2018-01-15 PROCEDURE — 83540 ASSAY OF IRON: CPT | Performed by: INTERNAL MEDICINE

## 2018-01-15 PROCEDURE — 99214 OFFICE O/P EST MOD 30 MIN: CPT | Performed by: INTERNAL MEDICINE

## 2018-01-15 PROCEDURE — 85025 COMPLETE CBC W/AUTO DIFF WBC: CPT | Performed by: INTERNAL MEDICINE

## 2018-01-15 PROCEDURE — 83690 ASSAY OF LIPASE: CPT | Performed by: INTERNAL MEDICINE

## 2018-01-15 RX ORDER — DIPHENOXYLATE HCL/ATROPINE 2.5-.025MG
2 TABLET ORAL 4 TIMES DAILY PRN
Qty: 30 TABLET | Refills: 0 | Status: SHIPPED | OUTPATIENT
Start: 2018-01-15 | End: 2021-04-19

## 2018-01-15 NOTE — MR AVS SNAPSHOT
After Visit Summary   1/15/2018    Kilo Joya    MRN: [id number]           Patient Information     Date Of Birth          1940        Visit Information        Provider Department      1/15/2018 11:10 AM Cali Goldstein MD [facility]        Today's Diagnoses     Acute diarrhea    -  1    Anemia, unspecified type          Care Instructions    Start taking lomotil for your diarrhea.      [facility]    If you have any questions regarding to your visit please contact your care team:     Team Pink:   Clinic Hours Telephone Number   Internal Medicine:  Dr. Wendy George NP       7am-7pm  Monday - Thursday   7am-5pm  Fridays  [phone]  (Appointment scheduling available 24/7)    Questions about your visit?  Team Line  [phone]   Urgent Care - [place] [facility] - 11am-9pm Monday-Friday Saturday-Sunday- 9am-5pm   [place] - 5pm-9pm Monday-Friday Saturday-Sunday- 9am-5pm  [phone] - [facility]  [phone] - [place]       What options do I have for visits at the clinic other than the traditional office visit?  To expand how we care for you, many of our providers are utilizing electronic visits (e-visits) and telephone visits, when medically appropriate, for interactions with their patients rather than a visit in the clinic.   We also offer nurse visits for many medical concerns. Just like any other service, we will bill your insurance company for this type of visit based on time spent on the phone with your provider. Not all insurance companies cover these visits. Please check with your medical insurance if this type of visit is covered. You will be responsible for any charges that are not paid by your insurance.      E-visits via [employer]:  generally incur a $35.00 fee.  Telephone visits:  Time spent on the phone: *charged based on time that is spent on the phone in increments of 10 minutes. Estimated  cost:   5-10 mins $30.00   11-20 mins. $59.00   21-30 mins. $85.00   Use ShareMemehart (secure email communication and access to your chart) to send your primary care provider a message or make an appointment. Ask someone on your Team how to sign up for MiniVaxt.    For a Price Quote for your services, please call our [employer] Line at [phone].    As always, Thank you for trusting us with your health care needs!    Discharged by Kalpana MAURICE CMA ([facility])            Follow-ups after your visit        Your next 10 appointments already scheduled     Nov 27, 2018 10:30 AM CST   LAB with LAB FIRST FLOOR [facility] ([facility])    [address] [place] [address] [phone]           Please do not eat 10-12 hours before your appointment if you are coming in fasting for labs on lipids, cholesterol, or glucose (sugar). This does not apply to pregnant women. Water, hot tea and black coffee (with nothing added) are okay. Do not drink other fluids, diet soda or chew gum.            Nov 27, 2018 11:00 AM CST   Return Visit with Anjana Carpio MD   [facility] ([facility])    [address] [place] [id number] [phone]              Who to contact     If you have questions or need follow up information about today's clinic visit or your schedule please contact [facility] directly at [phone].  Normal or non-critical lab and imaging results will be communicated to you by MyChart, letter or phone within 4 business days after the clinic has received the results. If you do not hear from us within 7 days, please contact the clinic through MyChart or phone. If you have a critical or abnormal lab result, we will notify you by phone as soon as possible.  Submit refill requests through gDine or call your pharmacy and they will forward the refill request to us. Please allow 3 business days for your  "refill to be completed.          Additional Information About Your Visit        TeachTown Information     TeachTown lets you send messages to your doctor, view your test results, renew your prescriptions, schedule appointments and more. To sign up, go to [email or web address] . Click on \"Log in\" on the left side of the screen, which will take you to the Welcome page. Then click on \"Sign up Now\" on the right side of the page.     You will be asked to enter the access code listed below, as well as some personal information. Please follow the directions to create your username and password.     Your access code is: [email or web address]  Expires: 2018 11:20 AM     Your access code will  in 90 days. If you need help or a new code, please call your [place] clinic or [phone].        Care EveryWhere ID     This is your Care EveryWhere ID. This could be used by other organizations to access your [place] medical records  [id number]        Your Vitals Were     Pulse Temperature Respirations Pulse Oximetry BMI (Body Mass Index)       102 98  F (36.7  C) (Oral) 14 98% 26.94 kg/m2        Blood Pressure from Last 3 Encounters:   01/15/18 96/64   17 136/73   17 110/60    Weight from Last 3 Encounters:   01/15/18 172 lb (78 kg)   17 174 lb 6.4 oz (79.1 kg)   17 171 lb (77.6 kg)              We Performed the Following     CBC with platelets differential     Comprehensive metabolic panel     CRP inflammation     Erythrocyte sedimentation rate auto     Ferritin     Iron and iron binding capacity     Lipase          Today's Medication Changes          These changes are accurate as of: 1/15/18 11:34 AM.  If you have any questions, ask your nurse or doctor.               Start taking these medicines.        Dose/Directions    diphenoxylate-atropine 2.5-0.025 MG per tablet   Commonly known as:  LOMOTIL   Used for:  Acute diarrhea, Anemia, unspecified type   Started by:  Cali Goldstein MD        Dose:  2 " tablet   Take 2 tablets by mouth 4 times daily as needed for diarrhea   Quantity:  30 tablet   Refills:  0            Where to get your medicines      Some of these will need a paper prescription and others can be bought over the counter.  Ask your nurse if you have questions.     Bring a paper prescription for each of these medications     diphenoxylate-atropine 2.5-0.025 MG per tablet                Primary Care Provider Office Phone # Fax #    Cali Goldstein [phone] [facility] [address]        Equal Access to Services     [facility]: Hadii aad ku hadasho Soomaali, waaxda luqadaha, qaybta kaalmada adeegyada, waxmejia desir . So [facility] [phone].    ATENCIÓN: Si habla español, tiene a hoffman disposición servicios gratuitos de asistencia lingüística. LlHolzer Health System [phone].    We comply with applicable federal civil rights laws and [place] laws. We do not discriminate on the basis of race, color, national origin, age, disability, sex, sexual orientation, or gender identity.            Thank you!     Thank you for choosing [facility]  for your care. Our goal is always to provide you with excellent care. Hearing back from our patients is one way we can continue to improve our services. Please take a few minutes to complete the written survey that you may receive in the mail after your visit with us. Thank you!             Your Updated Medication List - Protect others around you: Learn how to safely use, store and throw away your medicines at www.disposemymeds.org.          This list is accurate as of: 1/15/18 11:34 AM.  Always use your most recent med list.                   Brand Name Dispense Instructions for use Diagnosis    aspirin 81 MG tablet      Take by mouth daily        diphenoxylate-atropine 2.5-0.025 MG per tablet    LOMOTIL    30 tablet    Take 2 tablets by mouth 4 times daily as needed for diarrhea    Acute diarrhea,  Anemia, unspecified type       Glucosamine Sulfate 500 MG Tabs      Take 500 mg by mouth        MULTIVITAMIN PO      Take 1 tablet by mouth daily.    CKD (chronic kidney disease) stage 3, GFR 30-59 ml/min       OMEGA-3 FISH OIL PO

## 2018-01-15 NOTE — PROGRESS NOTES
SUBJECTIVE:   Kilo Joya is a 77 year old male who presents to clinic today with his wife for the following health issues:    Patient has had diarrhea since 1/7. He ordered pizza on Saturday night and got up at 3am with watery, explosive diarrhea. Later in the day, he had another episode. He has had recurring episodes since then with an average of 3 episodes per day. 3-4 days after the first episode, he had some formed stools for about a day. On Monday afternoon, he bought imodium and finished all 12 pills a few days ago. He says imodium helped him a little. His wife started feeding him rice which he also says helps a little. His appetite is reasonable. Sometimes, he will have diarrhea a couple times in a row. The first day, his stools were all brown. Then it was green-black for two days. Then it was almost black, vishnu green for Monday and Tuesday of last week. Stools have been brown since then. He has no history of gastroenterological bleeding. He does have chronic lymphocytic leukemia and wanted his laboratory studies checked today to include a white blood cell count.     When he is about to have an episode of diarrhea, he experiences abdominal gurgling with no pain. Feels better immediately after bowel movement. Patient says he is not on any new medications. He notes that him and his wife were sick the week before and did not take antibiotics. His wife says that the Saturday before Christmas, patient got sick and then his wife was sick a week later. They were both coughing up green mucus and used a SinuCleanse  Neti Pot .     Patient has not experienced this before. Denies nausea or vomiting. Denies seeing blood in stools.        Problem list and histories reviewed & adjusted, as indicated.  Additional history: as documented    Patient Active Problem List   Diagnosis     Hyperlipidemia with target LDL less than 100     Scrotal pain     Advanced directives, counseling/discussion     CKD (chronic kidney  disease) stage 3, GFR 30-59 ml/min     Chronic lymphocytic leukemia (H)     Xerosis cutis     Actinic keratoses     Elevated coronary artery calcium score     Hopkins cardiac risk 10-20% in next 10 years     Peripheral polyneuropathy     Anemia     Past Surgical History:   Procedure Laterality Date     CARPAL TUNNEL RELEASE RT/LT      left hand       Social History   Substance Use Topics     Smoking status: Never Smoker     Smokeless tobacco: Never Used     Alcohol use Yes      Comment: rarely     Family History   Problem Relation Age of Onset     CANCER Brother      blood cancer     Liver Disease Father      Liver Disease Paternal Aunt      as well as a paternal cousin         Current Outpatient Prescriptions   Medication Sig Dispense Refill     aspirin 81 MG tablet Take by mouth daily       Glucosamine Sulfate 500 MG TABS Take 500 mg by mouth       Omega-3 Fatty Acids (OMEGA-3 FISH OIL PO)        Multiple Vitamins-Minerals (MULTIVITAMIN OR) Take 1 tablet by mouth daily.       Labs reviewed in EPIC      Reviewed and updated as needed this visit by clinical staffTobacco  Allergies  Meds  Med Hx  Surg Hx  Fam Hx  Soc Hx      Reviewed and updated as needed this visit by Provider         ROS:  Constitutional, HEENT, cardiovascular, pulmonary, GI, , musculoskeletal, neuro, skin, endocrine and psych systems are negative, except as otherwise noted.    This document serves as a record of the services and decisions personally performed and made by Cali Goldstein MD. It was created on his/her behalf by Radha Dalal, trained medical scribe. The creation of this document is based the provider's statements to the medical scribes.    Daryl Dalal 11:21 AM, January 15, 2018    OBJECTIVE:   BP 96/64  Pulse 102  Temp 98  F (36.7  C) (Oral)  Resp 14  Wt 78 kg (172 lb)  SpO2 98%  BMI 26.94 kg/m2  Body mass index is 26.94 kg/(m^2).  GENERAL: healthy, alert and no distress  ABDOMEN: soft, no hepatosplenomegaly, no  masses and bowel sounds normal, diffuse abdominal tenderness without peritoneal signs, negative Campos's sign, no definite tenderness on Mcburney's point  NEURO: Normal strength and tone, mentation intact and speech normal  PSYCH: mentation appears normal, affect normal/bright    Diagnostic Test Results:  Orders Placed This Encounter   Procedures     Erythrocyte sedimentation rate auto     CRP inflammation     CBC with platelets differential     Comprehensive metabolic panel     Lipase     Ferritin     Iron and iron binding capacity         ASSESSMENT/PLAN:   (R19.7) Acute diarrhea  (primary encounter diagnosis)  Comment: Patient has been having recurrent diarrhea for the past 8 days. He has tried taking imodium and eating rice with no significant relief. The duration of symptoms is just a little bit outside the typical window of food poisoning or viral gastroenteritis although this certainly could still be secondary to viral illness. The logical next step if his symptoms don't subside would be stool studies to include ova and parasites, cdiff etc. Patient is nontoxic general appearance and I am not worried about sepsis   Plan: Erythrocyte sedimentation rate auto, CRP         inflammation, CBC with platelets differential,         Comprehensive metabolic panel, Lipase,         diphenoxylate-atropine (LOMOTIL) 2.5-0.025 MG         per tablet, Ferritin, Iron and iron binding         capacity        Patient will start on Lomotil and get tested for blood in stools.    (D64.9) Anemia, unspecified type  Comment: the dark stools could at least theoretically be gastroenterological bleed   Plan: Erythrocyte sedimentation rate auto, CRP         inflammation, CBC with platelets differential,         Comprehensive metabolic panel, Lipase,         diphenoxylate-atropine (LOMOTIL) 2.5-0.025 MG         per tablet, Ferritin, Iron and iron binding         capacity        Follow up as indicated on results     Patient Instructions      Start taking lomotil for your diarrhea.      [facility]    If you have any questions regarding to your visit please contact your care team:     Team Pink:   Clinic Hours Telephone Number   Internal Medicine:  Dr. Wendy George, NP       7am-7pm  Monday - Thursday   7am-5pm  Fridays  [phone]  (Appointment scheduling available 24/7)    Questions about your visit?  Team Line  [phone]   Urgent Care - [place] and [place] - 11am-9pm Monday-Friday Saturday-Sunday- 9am-5pm   [place] - 5pm-9pm Monday-Friday Saturday-Sunday- 9am-5pm  [phone] - [place]   [phone] - [place]       What options do I have for visits at the clinic other than the traditional office visit?  To expand how we care for you, many of our providers are utilizing electronic visits (e-visits) and telephone visits, when medically appropriate, for interactions with their patients rather than a visit in the clinic.   We also offer nurse visits for many medical concerns. Just like any other service, we will bill your insurance company for this type of visit based on time spent on the phone with your provider. Not all insurance companies cover these visits. Please check with your medical insurance if this type of visit is covered. You will be responsible for any charges that are not paid by your insurance.      E-visits via [employer]:  generally incur a $35.00 fee.  Telephone visits:  Time spent on the phone: *charged based on time that is spent on the phone in increments of 10 minutes. Estimated cost:   5-10 mins $30.00   11-20 mins. $59.00   21-30 mins. $85.00   Use [employer] (secure email communication and access to your chart) to send your primary care provider a message or make an appointment. Ask someone on your Team how to sign up for [employer].    For a Price Quote for your services, please call our Consumer Price Line at [phone].    As always, Thank you for  trusting us with your health care needs!    Discharged by Kalpana MAURICE CMA ([facility])        The information in this document, created by a scribe for me, accurately reflects the services I personally performed and the decisions made by me. I have reviewed and approved this document for accuracy.     Cali Goldstein MD  [facility]

## 2018-01-15 NOTE — TELEPHONE ENCOUNTER
Per Dr. Goldstein: was alerted with critical lab result value. WBC 57.20, However, patient has chronic lymphocytic leukemia. Please let patient know we will forward the result to his oncologist. Other labs still pending    Patient updated.  Please fax results to Dr. Rodríguez Villarreal, [facility] near [facility]      Raymond Mckeon RN

## 2018-01-15 NOTE — PATIENT INSTRUCTIONS
Start taking lomotil for your diarrhea.      [facility]    If you have any questions regarding to your visit please contact your care team:     Team Pink:   Clinic Hours Telephone Number   Internal Medicine:  Dr. Wendy George, NP       7am-7pm  Monday - Thursday   7am-5pm  Fridays  [phone]  (Appointment scheduling available 24/7)    Questions about your visit?  Team Line  [phone]   Urgent Care - [place] and [place] - 11am-9pm Monday-Friday Saturday-Sunday- 9am-5pm   [place] - 5pm-9pm Monday-Friday Saturday-Sunday- 9am-5pm  [phone] - [place]   [phone] - [place]       What options do I have for visits at the clinic other than the traditional office visit?  To expand how we care for you, many of our providers are utilizing electronic visits (e-visits) and telephone visits, when medically appropriate, for interactions with their patients rather than a visit in the clinic.   We also offer nurse visits for many medical concerns. Just like any other service, we will bill your insurance company for this type of visit based on time spent on the phone with your provider. Not all insurance companies cover these visits. Please check with your medical insurance if this type of visit is covered. You will be responsible for any charges that are not paid by your insurance.      E-visits via [employer]:  generally incur a $35.00 fee.  Telephone visits:  Time spent on the phone: *charged based on time that is spent on the phone in increments of 10 minutes. Estimated cost:   5-10 mins $30.00   11-20 mins. $59.00   21-30 mins. $85.00   Use [employer] (secure email communication and access to your chart) to send your primary care provider a message or make an appointment. Ask someone on your Team how to sign up for [employer].    For a Price Quote for your services, please call our Consumer Price Line at [phone].    As always, Thank you for  trusting us with your health care needs!    Discharged by Kalpana MAURICE CMA ([facility])

## 2018-02-06 ENCOUNTER — TRANSFERRED RECORDS (OUTPATIENT)
Dept: HEALTH INFORMATION MANAGEMENT | Facility: CLINIC | Age: 78
End: 2018-02-06

## 2018-08-06 ENCOUNTER — TRANSFERRED RECORDS (OUTPATIENT)
Dept: HEALTH INFORMATION MANAGEMENT | Facility: CLINIC | Age: 78
End: 2018-08-06

## 2018-10-09 ENCOUNTER — TRANSFERRED RECORDS (OUTPATIENT)
Dept: HEALTH INFORMATION MANAGEMENT | Facility: CLINIC | Age: 78
End: 2018-10-09

## 2018-11-19 DIAGNOSIS — N18.30 CKD (CHRONIC KIDNEY DISEASE) STAGE 3, GFR 30-59 ML/MIN (H): Primary | ICD-10-CM

## 2018-11-27 ENCOUNTER — OFFICE VISIT (OUTPATIENT)
Dept: NEPHROLOGY | Facility: CLINIC | Age: 78
End: 2018-11-27
Payer: MEDICARE

## 2018-11-27 VITALS
WEIGHT: 174 LBS | HEART RATE: 72 BPM | DIASTOLIC BLOOD PRESSURE: 68 MMHG | BODY MASS INDEX: 27.25 KG/M2 | SYSTOLIC BLOOD PRESSURE: 123 MMHG | OXYGEN SATURATION: 98 %

## 2018-11-27 DIAGNOSIS — R39.11 URINARY HESITANCY: ICD-10-CM

## 2018-11-27 DIAGNOSIS — N18.30 CKD (CHRONIC KIDNEY DISEASE) STAGE 3, GFR 30-59 ML/MIN (H): ICD-10-CM

## 2018-11-27 DIAGNOSIS — C91.10 CHRONIC LYMPHOCYTIC LEUKEMIA (H): ICD-10-CM

## 2018-11-27 DIAGNOSIS — N18.30 CKD (CHRONIC KIDNEY DISEASE) STAGE 3, GFR 30-59 ML/MIN (H): Primary | ICD-10-CM

## 2018-11-27 DIAGNOSIS — D64.9 ANEMIA, UNSPECIFIED TYPE: ICD-10-CM

## 2018-11-27 DIAGNOSIS — Z12.5 ENCOUNTER FOR SCREENING FOR MALIGNANT NEOPLASM OF PROSTATE: ICD-10-CM

## 2018-11-27 LAB
ALBUMIN SERPL-MCNC: 3.5 G/DL (ref 3.4–5)
ALBUMIN UR-MCNC: NEGATIVE MG/DL
ANION GAP SERPL CALCULATED.3IONS-SCNC: 4 MMOL/L (ref 3–14)
APPEARANCE UR: CLEAR
BILIRUB UR QL STRIP: NEGATIVE
BUN SERPL-MCNC: 30 MG/DL (ref 7–30)
CALCIUM SERPL-MCNC: 8.5 MG/DL (ref 8.5–10.1)
CHLORIDE SERPL-SCNC: 107 MMOL/L (ref 94–109)
CO2 SERPL-SCNC: 28 MMOL/L (ref 20–32)
COLOR UR AUTO: NORMAL
CREAT SERPL-MCNC: 1.39 MG/DL (ref 0.66–1.25)
CREAT UR-MCNC: 76 MG/DL
GFR SERPL CREATININE-BSD FRML MDRD: 49 ML/MIN/1.7M2
GLUCOSE SERPL-MCNC: 91 MG/DL (ref 70–99)
GLUCOSE UR STRIP-MCNC: NEGATIVE MG/DL
HGB BLD-MCNC: 12.5 G/DL (ref 13.3–17.7)
HGB UR QL STRIP: NEGATIVE
KETONES UR STRIP-MCNC: NEGATIVE MG/DL
LEUKOCYTE ESTERASE UR QL STRIP: NEGATIVE
NITRATE UR QL: NEGATIVE
PH UR STRIP: 5.5 PH (ref 5–7)
PHOSPHATE SERPL-MCNC: 3.3 MG/DL (ref 2.5–4.5)
POTASSIUM SERPL-SCNC: 4.2 MMOL/L (ref 3.4–5.3)
PROT UR-MCNC: <0.05 G/L
PROT/CREAT 24H UR: NORMAL G/G CR (ref 0–0.2)
PSA SERPL-ACNC: 1.75 UG/L (ref 0–4)
PTH-INTACT SERPL-MCNC: 34 PG/ML (ref 18–80)
SODIUM SERPL-SCNC: 139 MMOL/L (ref 133–144)
SOURCE: NORMAL
SP GR UR STRIP: 1.01 (ref 1–1.03)
UROBILINOGEN UR STRIP-MCNC: NORMAL MG/DL (ref 0–2)

## 2018-11-27 PROCEDURE — 83970 ASSAY OF PARATHORMONE: CPT | Performed by: INTERNAL MEDICINE

## 2018-11-27 PROCEDURE — 80069 RENAL FUNCTION PANEL: CPT | Performed by: INTERNAL MEDICINE

## 2018-11-27 PROCEDURE — 36415 COLL VENOUS BLD VENIPUNCTURE: CPT | Performed by: INTERNAL MEDICINE

## 2018-11-27 PROCEDURE — 84156 ASSAY OF PROTEIN URINE: CPT | Performed by: INTERNAL MEDICINE

## 2018-11-27 PROCEDURE — 81003 URINALYSIS AUTO W/O SCOPE: CPT | Performed by: INTERNAL MEDICINE

## 2018-11-27 PROCEDURE — 85018 HEMOGLOBIN: CPT | Performed by: INTERNAL MEDICINE

## 2018-11-27 PROCEDURE — 99214 OFFICE O/P EST MOD 30 MIN: CPT | Performed by: INTERNAL MEDICINE

## 2018-11-27 PROCEDURE — G0103 PSA SCREENING: HCPCS | Performed by: INTERNAL MEDICINE

## 2018-11-27 ASSESSMENT — PAIN SCALES - GENERAL: PAINLEVEL: NO PAIN (0)

## 2018-11-27 NOTE — PATIENT INSTRUCTIONS
1. I will update you on the urine test and the prostate screen test.   2. Post void residual bladder scan.   3. Once a year follow-up.

## 2018-11-27 NOTE — LETTER
November 28, 2018      Kilo Joya  [address] [place] [id number]              Dear Kilo,      The urine shows that there is no sign of infection which is reassuring. Your prostate specific antigen (screen for prostate Cancer) was normal which is also reassuring. If your urinary symptoms worsen, urology would be appropriate to be seen.      There is no protein present in the urine which is reassuring. Your kidney function is stable but not normal as you are aware.   Please call with any questions or concerns.         Sincerely,      Anjana Carpio DO    Division of Renal Diseases and Hypertension  [facility]  KW/gw                      Component      Latest Ref Rng & Units 11/27/2018   Sodium      133 - 144 mmol/L 139   Potassium      3.4 - 5.3 mmol/L 4.2   Chloride      94 - 109 mmol/L 107   Carbon Dioxide      20 - 32 mmol/L 28   Anion Gap      3 - 14 mmol/L 4   Glucose      70 - 99 mg/dL 91   Urea Nitrogen      7 - 30 mg/dL 30   Creatinine      0.66 - 1.25 mg/dL 1.39 (H)   GFR Estimate      >60 mL/min/1.7m2 49 (L)   GFR Estimate If Black      >60 mL/min/1.7m2 60 (L)   Calcium      8.5 - 10.1 mg/dL 8.5   Phosphorus      2.5 - 4.5 mg/dL 3.3   Albumin      3.4 - 5.0 g/dL 3.5   Color Urine       Light Yellow   Appearance Urine       Clear   Glucose Urine      NEG:Negative mg/dL Negative   Bilirubin Urine      NEG:Negative Negative   Ketones Urine      NEG:Negative mg/dL Negative   Specific Gravity Urine      1.003 - 1.035 1.011   Blood Urine      NEG:Negative Negative   pH Urine      5.0 - 7.0 pH 5.5   Protein Albumin Urine      NEG:Negative mg/dL Negative   Urobilinogen mg/dL      0.0 - 2.0 mg/dL Normal   Nitrite Urine      NEG:Negative Negative   Leukocyte Esterase Urine      NEG:Negative Negative   Source       Midstream Urine   Protein Random Urine      g/L <0.05   Protein Total Urine g/gr Creatinine      0 - 0.2 g/g Cr Unable to calculate due to low value    Hemoglobin      13.3 - 17.7 g/dL 12.5 (L)   Parathyroid Hormone Intact      18 - 80 pg/mL 34   Creatinine Urine      mg/dL 76   PSA      0 - 4 ug/L 1.75

## 2018-11-27 NOTE — MR AVS SNAPSHOT
After Visit Summary   11/27/2018    Kilo Joya    MRN: [id number]           Patient Information     Date Of Birth          1940        Visit Information        Provider Department      11/27/2018 11:00 AM Anjana Carpio MD [facility]        Today's Diagnoses     CKD (chronic kidney disease) stage 3, GFR 30-59 ml/min (H)    -  1    Urinary hesitancy        Encounter for screening for malignant neoplasm of prostate           Care Instructions    1. I will update you on the urine test and the prostate screen test.   2. Post void residual bladder scan.   3. Once a year follow-up.           Follow-ups after your visit        Your next 10 appointments already scheduled     Nov 26, 2019 10:30 AM CST   LAB with LAB FIRST FLOOR [facility] ([facility])    [address] [place] [id number] [phone]           Please do not eat 10-12 hours before your appointment if you are coming in fasting for labs on lipids, cholesterol, or glucose (sugar). This does not apply to pregnant women. Water, hot tea and black coffee (with nothing added) are okay. Do not drink other fluids, diet soda or chew gum.            Nov 26, 2019 11:00 AM CST   Return Visit with Anjana Carpio MD   [facility] ([facility])    [address] [place] [id number] [phone]              Who to contact     If you have questions or need follow up information about today's clinic visit or your schedule please contact [facility] directly at [phone].  Normal or non-critical lab and imaging results will be communicated to you by MyChart, letter or phone within 4 business days after the clinic has received the results. If you do not hear from us within 7 days, please contact the clinic through MyChart or phone. If you have a critical or abnormal lab result, we will notify you  by phone as soon as possible.  Submit refill requests through [employer] or call your pharmacy and they will forward the refill request to us. Please allow 3 business days for your refill to be completed.          Additional Information About Your Visit        Care EveryWhere ID     This is your Care EveryWhere ID. This could be used by other organizations to access your [facility] medical records  [id number]        Your Vitals Were     Pulse Pulse Oximetry BMI (Body Mass Index)             72 98% 27.25 kg/m2          Blood Pressure from Last 3 Encounters:   11/27/18 123/68   01/15/18 96/64   11/28/17 136/73    Weight from Last 3 Encounters:   11/27/18 78.9 kg (174 lb)   01/15/18 78 kg (172 lb)   11/28/17 79.1 kg (174 lb 6.4 oz)              We Performed the Following     PROSTATE SPEC ANTIGEN,SCREEN     UA reflex to Microscopic and Culture        Primary Care Provider Office Phone # Fax #    Cali Goldstein [phone]       [address] [facility] [id number]        Equal Access to Services     [facility]: Hadii aad ku hadasho Soomaali, waaxda luqadaha, qaybta kaalmada adeegyaforeign, waxmejia desir . So [facility] [phone].    ATENCIÓN: Si habla español, tiene a hoffman disposición servicios gratuitos de asistencia lingüística. Llame al [phone].    We comply with applicable federal civil rights laws and [place] laws. We do not discriminate on the basis of race, color, national origin, age, disability, sex, sexual orientation, or gender identity.            Thank you!     Thank you for choosing [facility]  for your care. Our goal is always to provide you with excellent care. Hearing back from our patients is one way we can continue to improve our services. Please take a few minutes to complete the written survey that you may receive in the mail after your visit with us. Thank you!             Your Updated Medication List - Protect others around you: Learn  how to safely use, store and throw away your medicines at www.disposemymeds.org.          This list is accurate as of 11/27/18 12:12 PM.  Always use your most recent med list.                   Brand Name Dispense Instructions for use Diagnosis    aspirin 81 MG tablet    ASA     Take by mouth every 7 days        diphenoxylate-atropine 2.5-0.025 MG tablet    LOMOTIL    30 tablet    Take 2 tablets by mouth 4 times daily as needed for diarrhea    Acute diarrhea, Anemia, unspecified type       Glucosamine Sulfate 500 MG Tabs      Take 500 mg by mouth        MULTIVITAMIN PO      Take 1 tablet by mouth daily.    CKD (chronic kidney disease) stage 3, GFR 30-59 ml/min (H)       OMEGA-3 FISH OIL PO

## 2018-11-27 NOTE — NURSING NOTE
Kilo Joya's goals for this visit include:   Chief Complaint   Patient presents with     RECHECK     yrly recheck CKD       He requests these members of his care team be copied on today's visit information: no    PCP: Cali Goldstein    Referring Provider:  No referring provider defined for this encounter.    /68 (BP Location: Left arm, Patient Position: Sitting, Cuff Size: Adult Regular)  Pulse 72  Wt 78.9 kg (174 lb)  SpO2 98%  BMI 27.25 kg/m2    Do you need any medication refills at today's visit? Unsure    Rosalee Dhillon LPN

## 2018-12-20 NOTE — PROGRESS NOTES
11/28/18  CC: CKD    HPI: Derick Joya is a 78 year old male who presents for follow-up of CKD.  On review of risk factors for CKD, his work did expose him to various solutions such as methanol, acetone, and various metals at times. No other significant risk factors. He is followed by Dr. Robles at [facility] for his CLL but has not had treatment - just following for now. Creatinine has been 1.43-1.57 in the past year; 1.39 now; no proteinuria. BP at goal. He has no pitting edema - swelling is always better in the AM - he wears TEDs for his mild edema concerns. He denies burning or pain with urination but has trouble urinating at times.      Allergies   Allergen Reactions     Sulfa Drugs Hives and Swelling         Current Outpatient Medications on File Prior to Visit:  aspirin 81 MG tablet Take by mouth every 7 days    Glucosamine Sulfate 500 MG TABS Take 500 mg by mouth   Multiple Vitamins-Minerals (MULTIVITAMIN OR) Take 1 tablet by mouth daily.   Omega-3 Fatty Acids (OMEGA-3 FISH OIL PO)    diphenoxylate-atropine (LOMOTIL) 2.5-0.025 MG per tablet Take 2 tablets by mouth 4 times daily as needed for diarrhea (Patient not taking: Reported on 11/27/2018)     No current facility-administered medications on file prior to visit.     Past Medical History:   Diagnosis Date     Ankle fracture, right      CLL (chronic lymphocytic leukaemia)      Left knee pain     takes glucosamine     Shingles        Past Surgical History:   Procedure Laterality Date     CARPAL TUNNEL RELEASE RT/LT      left hand       Social History     Tobacco Use     Smoking status: Never Smoker     Smokeless tobacco: Never Used   Substance Use Topics     Alcohol use: Yes     Comment: rarely     Drug use: No       Family History   Problem Relation Age of Onset     Cancer Brother         blood cancer     Liver Disease Father      Liver Disease Paternal Aunt         as well as a paternal cousin       ROS: A 4 system review of systems was negative  other than noted here or above.     Exam:  /68 (BP Location: Left arm, Patient Position: Sitting, Cuff Size: Adult Regular)   Pulse 72   Wt 78.9 kg (174 lb)   SpO2 98%   BMI 27.25 kg/m      GENERAL APPEARANCE: alert and no distress  RESP: lungs clear to auscultation   CV: regular rhythm, normal rate, no rub  Extremities: no clubbing, cyanosis,TEDs in place, no pitting edema  SKIN: no rash  NEURO: mentation intact and speech normal  PSYCH: affect normal/bright    Office Visit on 11/27/2018   Component Date Value Ref Range Status     PSA 11/27/2018 1.75  0 - 4 ug/L Final    Assay Method:  Chemiluminescence using Siemens Vista analyzer     Color Urine 11/27/2018 Light Yellow   Final     Appearance Urine 11/27/2018 Clear   Final     Glucose Urine 11/27/2018 Negative  NEG^Negative mg/dL Final     Bilirubin Urine 11/27/2018 Negative  NEG^Negative Final     Ketones Urine 11/27/2018 Negative  NEG^Negative mg/dL Final     Specific Gravity Urine 11/27/2018 1.011  1.003 - 1.035 Final     Blood Urine 11/27/2018 Negative  NEG^Negative Final     pH Urine 11/27/2018 5.5  5.0 - 7.0 pH Final     Protein Albumin Urine 11/27/2018 Negative  NEG^Negative mg/dL Final     Urobilinogen mg/dL 11/27/2018 Normal  0.0 - 2.0 mg/dL Final     Nitrite Urine 11/27/2018 Negative  NEG^Negative Final     Leukocyte Esterase Urine 11/27/2018 Negative  NEG^Negative Final     Source 11/27/2018 Midstream Urine   Final   Orders Only on 11/27/2018   Component Date Value Ref Range Status     Protein Random Urine 11/27/2018 <0.05  g/L Final     Protein Total Urine g/gr Creatinine 11/27/2018 Unable to calculate due to low value  0 - 0.2 g/g Cr Final     Hemoglobin 11/27/2018 12.5* 13.3 - 17.7 g/dL Final     Parathyroid Hormone Intact 11/27/2018 34  18 - 80 pg/mL Final     Sodium 11/27/2018 139  133 - 144 mmol/L Final     Potassium 11/27/2018 4.2  3.4 - 5.3 mmol/L Final     Chloride 11/27/2018 107  94 - 109 mmol/L Final     Carbon Dioxide 11/27/2018 28   20 - 32 mmol/L Final     Anion Gap 11/27/2018 4  3 - 14 mmol/L Final     Glucose 11/27/2018 91  70 - 99 mg/dL Final     Urea Nitrogen 11/27/2018 30  7 - 30 mg/dL Final     Creatinine 11/27/2018 1.39* 0.66 - 1.25 mg/dL Final     GFR Estimate 11/27/2018 49* >60 mL/min/1.7m2 Final    Non  GFR Calc     GFR Estimate If Black 11/27/2018 60* >60 mL/min/1.7m2 Final    African American GFR Calc     Calcium 11/27/2018 8.5  8.5 - 10.1 mg/dL Final     Phosphorus 11/27/2018 3.3  2.5 - 4.5 mg/dL Final     Albumin 11/27/2018 3.5  3.4 - 5.0 g/dL Final     Creatinine Urine 11/27/2018 76  mg/dL Final        Assessment/Plan:  1. CKD Stage 3:  cystatin C showed a GFR of 60 at the same time as a calculated GFR of 53. He does not have an obvious etiology of his CKD. He does have a hx of possible exposure to various metals in the past - unclear whether he had lead or other metal exposure causing kidney injury in the past. Regardless, he has not had any evidence of hematuria/proteinuria and his kidney function has been completely stable and thus do not feel renal biopsy is indicated. Instead, will follow to assure stability.     2. CLL: followed by Dr. Robles at [facility] - monitoring without therapy at this point in time.     3. Anemia: hemoglobin 12.5 at this time    4. Difficulty with urination: will get PSA and rule out infection - plan of PVR scan     Patient Instructions   1. I will update you on the urine test and the prostate screen test.   2. Post void residual bladder scan.   3. Once a year follow-up.      Anjana Carpio, DO

## 2019-01-03 ENCOUNTER — ANCILLARY PROCEDURE (OUTPATIENT)
Dept: GENERAL RADIOLOGY | Facility: CLINIC | Age: 79
End: 2019-01-03
Payer: MEDICARE

## 2019-01-03 ENCOUNTER — OFFICE VISIT (OUTPATIENT)
Dept: FAMILY MEDICINE | Facility: CLINIC | Age: 79
End: 2019-01-03
Payer: MEDICARE

## 2019-01-03 VITALS
BODY MASS INDEX: 26.84 KG/M2 | RESPIRATION RATE: 20 BRPM | OXYGEN SATURATION: 98 % | WEIGHT: 171 LBS | TEMPERATURE: 97.1 F | HEIGHT: 67 IN | DIASTOLIC BLOOD PRESSURE: 74 MMHG | SYSTOLIC BLOOD PRESSURE: 126 MMHG | HEART RATE: 92 BPM

## 2019-01-03 DIAGNOSIS — J06.9 UPPER RESPIRATORY TRACT INFECTION, UNSPECIFIED TYPE: ICD-10-CM

## 2019-01-03 DIAGNOSIS — R05.9 COUGH: ICD-10-CM

## 2019-01-03 DIAGNOSIS — R05.9 COUGH: Primary | ICD-10-CM

## 2019-01-03 PROCEDURE — 71046 X-RAY EXAM CHEST 2 VIEWS: CPT

## 2019-01-03 PROCEDURE — 99213 OFFICE O/P EST LOW 20 MIN: CPT | Performed by: INTERNAL MEDICINE

## 2019-01-03 RX ORDER — AZITHROMYCIN 250 MG/1
TABLET, FILM COATED ORAL
Qty: 6 TABLET | Refills: 0 | Status: SHIPPED | OUTPATIENT
Start: 2019-01-03 | End: 2021-03-12

## 2019-01-03 RX ORDER — BENZONATATE 200 MG/1
200 CAPSULE ORAL 3 TIMES DAILY PRN
Qty: 20 CAPSULE | Refills: 0 | Status: SHIPPED | OUTPATIENT
Start: 2019-01-03 | End: 2019-01-10

## 2019-01-03 ASSESSMENT — MIFFLIN-ST. JEOR: SCORE: 1454.28

## 2019-01-03 NOTE — PROGRESS NOTES
SUBJECTIVE:   Kilo Joya is a 78 year old male who presents to clinic today for the following health issues:    URI  Patient reports symptoms of cough, runny nose, chest congestion, fatigue, intermittent sore throat for the last 10 days or so. His coughing is often worse at night and he coughs about 12 times in a row he says. He uses about half a box of tissues a day with mostly clear phlegm. Denies fevers, chills. He says his ears don't really hurt. Denies history of pneumonia.     Additional Information   He has a CLL diagnosis and follows with oncology every few months. He is not being treated at this time. He notes some lower extremity edema and wears compression stockings all the time he says. He mentions that his wife often cleans out his ears a few times a year.     Problem list and histories reviewed & adjusted, as indicated.  Additional history: as documented    Patient Active Problem List   Diagnosis     Hyperlipidemia with target LDL less than 100     Scrotal pain     Advanced directives, counseling/discussion     CKD (chronic kidney disease) stage 3, GFR 30-59 ml/min (H)     Chronic lymphocytic leukemia (H)     Xerosis cutis     Actinic keratoses     Elevated coronary artery calcium score     [place] cardiac risk 10-20% in next 10 years     Peripheral polyneuropathy     Anemia     Past Surgical History:   Procedure Laterality Date     CARPAL TUNNEL RELEASE RT/LT      left hand       Social History     Tobacco Use     Smoking status: Never Smoker     Smokeless tobacco: Never Used   Substance Use Topics     Alcohol use: Yes     Comment: rarely     Family History   Problem Relation Age of Onset     Cancer Brother         blood cancer     Liver Disease Father      Liver Disease Paternal Aunt         as well as a paternal cousin         Current Outpatient Medications   Medication Sig Dispense Refill     aspirin 81 MG tablet Take by mouth every 7 days        Glucosamine Sulfate 500 MG TABS Take 500  "mg by mouth       Multiple Vitamins-Minerals (MULTIVITAMIN OR) Take 1 tablet by mouth daily.       Omega-3 Fatty Acids (OMEGA-3 FISH OIL PO)        diphenoxylate-atropine (LOMOTIL) 2.5-0.025 MG per tablet Take 2 tablets by mouth 4 times daily as needed for diarrhea (Patient not taking: Reported on 1/3/2019) 30 tablet 0     Reviewed and updated as needed this visit by clinical staff  Tobacco  Allergies  Meds  Med Hx  Surg Hx  Fam Hx  Soc Hx      Reviewed and updated as needed this visit by Provider       ROS:  Constitutional, HEENT, cardiovascular, pulmonary, GI, , musculoskeletal, neuro, skin, endocrine and psych systems are negative, except as otherwise noted.    This document serves as a record of the services and decisions personally performed and made by Cali Goldstein MD. It was created on his behalf by Lamont Thomas, a trained medical scribe. The creation of this document is based on the provider's statements to the medical scribe.  Lamont Thomas 1:55 PM January 3, 2019    OBJECTIVE:     /74   Pulse 92   Temp 97.1  F (36.2  C) (Oral)   Resp 20   Ht 1.702 m (5' 7\")   Wt 77.6 kg (171 lb)   SpO2 98%   BMI 26.78 kg/m    Body mass index is 26.78 kg/m .  GENERAL: healthy, alert and no distress  EYES: Eyes grossly normal to inspection, PERRL and conjunctivae and sclerae normal  HENT: cerumen impactions : he has about 80% occlusion of the right ear and left ear has just a speck of wax, throat appears normal   NECK: no adenopathy, no asymmetry, masses, or scars and thyroid normal to palpation  RESP: he has a slight rhonchi/wheeze in the left base  CV: regular rate and rhythm, normal S1 S2, no S3 or S4, no murmur, click or rub, no peripheral edema and peripheral pulses strong  SKIN: no suspicious lesions or rashes to visible skin   NEURO: Normal strength and tone, mentation intact and speech normal  PSYCH: mentation appears normal, affect normal/bright    Diagnostic Test Results:  No results found " for this or any previous visit (from the past 24 hour(s)).    ASSESSMENT/PLAN:     (R05) Cough  (primary encounter diagnosis)  Comment: fundamentally this patient doesn't seem terribly ill here but with his chronic lymphocytic leukemia he's technically an immune compromised state so treatment with antibiotics is the logical next step , beyond this we discussed what to be on the watch for  , update me if significant changes have taken place . Chest xray just to play it safe   Plan: XR Chest 2 Views, azithromycin (ZITHROMAX) 250         MG tablet, benzonatate (TESSALON) 200 MG         capsule            (J06.9) Upper respiratory tract infection, unspecified type  Comment: as above   Plan: XR Chest 2 Views, azithromycin (ZITHROMAX) 250         MG tablet, benzonatate (TESSALON) 200 MG         capsule              See Patient Instructions    The information in this document, created by the medical scribe for me, accurately reflects the services I personally performed and the decisions made by me. I have reviewed and approved this document for accuracy prior to leaving the patient care area.  January 3, 2019 1:56 PM    Cali Goldstein MD  [facility]

## 2019-01-08 ENCOUNTER — TRANSFERRED RECORDS (OUTPATIENT)
Dept: HEALTH INFORMATION MANAGEMENT | Facility: CLINIC | Age: 79
End: 2019-01-08

## 2019-04-02 ENCOUNTER — TRANSFERRED RECORDS (OUTPATIENT)
Dept: HEALTH INFORMATION MANAGEMENT | Facility: CLINIC | Age: 79
End: 2019-04-02

## 2019-06-27 ENCOUNTER — TRANSFERRED RECORDS (OUTPATIENT)
Dept: HEALTH INFORMATION MANAGEMENT | Facility: CLINIC | Age: 79
End: 2019-06-27

## 2019-09-20 ENCOUNTER — TRANSFERRED RECORDS (OUTPATIENT)
Dept: HEALTH INFORMATION MANAGEMENT | Facility: CLINIC | Age: 79
End: 2019-09-20

## 2019-10-21 DIAGNOSIS — N18.30 CKD (CHRONIC KIDNEY DISEASE) STAGE 3, GFR 30-59 ML/MIN (H): Primary | ICD-10-CM

## 2019-12-13 ENCOUNTER — TRANSFERRED RECORDS (OUTPATIENT)
Dept: HEALTH INFORMATION MANAGEMENT | Facility: CLINIC | Age: 79
End: 2019-12-13

## 2020-03-13 ENCOUNTER — TRANSFERRED RECORDS (OUTPATIENT)
Dept: HEALTH INFORMATION MANAGEMENT | Facility: CLINIC | Age: 80
End: 2020-03-13

## 2020-04-21 ENCOUNTER — TRANSFERRED RECORDS (OUTPATIENT)
Dept: HEALTH INFORMATION MANAGEMENT | Facility: CLINIC | Age: 80
End: 2020-04-21

## 2020-05-13 ENCOUNTER — TRANSFERRED RECORDS (OUTPATIENT)
Dept: HEALTH INFORMATION MANAGEMENT | Facility: CLINIC | Age: 80
End: 2020-05-13

## 2020-05-29 ENCOUNTER — TRANSFERRED RECORDS (OUTPATIENT)
Dept: HEALTH INFORMATION MANAGEMENT | Facility: CLINIC | Age: 80
End: 2020-05-29

## 2020-06-29 ENCOUNTER — TELEPHONE (OUTPATIENT)
Dept: NEPHROLOGY | Facility: CLINIC | Age: 80
End: 2020-06-29

## 2020-06-29 ENCOUNTER — OFFICE VISIT (OUTPATIENT)
Dept: INTERNAL MEDICINE | Facility: CLINIC | Age: 80
End: 2020-06-29
Payer: MEDICARE

## 2020-06-29 VITALS
TEMPERATURE: 98.7 F | WEIGHT: 165 LBS | SYSTOLIC BLOOD PRESSURE: 143 MMHG | DIASTOLIC BLOOD PRESSURE: 65 MMHG | OXYGEN SATURATION: 98 % | HEART RATE: 84 BPM | BODY MASS INDEX: 25.84 KG/M2 | RESPIRATION RATE: 16 BRPM

## 2020-06-29 DIAGNOSIS — H61.23 BILATERAL IMPACTED CERUMEN: Primary | ICD-10-CM

## 2020-06-29 DIAGNOSIS — N18.30 CKD (CHRONIC KIDNEY DISEASE) STAGE 3, GFR 30-59 ML/MIN (H): ICD-10-CM

## 2020-06-29 DIAGNOSIS — C91.10 CHRONIC LYMPHOCYTIC LEUKEMIA (H): ICD-10-CM

## 2020-06-29 DIAGNOSIS — D64.9 ANEMIA, UNSPECIFIED TYPE: ICD-10-CM

## 2020-06-29 DIAGNOSIS — E78.5 HYPERLIPIDEMIA WITH TARGET LDL LESS THAN 100: ICD-10-CM

## 2020-06-29 PROCEDURE — 99213 OFFICE O/P EST LOW 20 MIN: CPT | Mod: 25 | Performed by: INTERNAL MEDICINE

## 2020-06-29 PROCEDURE — 69209 REMOVE IMPACTED EAR WAX UNI: CPT | Performed by: INTERNAL MEDICINE

## 2020-06-29 NOTE — PROGRESS NOTES
Subjective     Kilo Joya is a 80 year old male who presents to clinic today for the following health issues:       Bilateral impacted cerumen  Hyperlipidemia with target LDL less than 100  Chronic lymphocytic leukemia (H)  Anemia, unspecified type  CKD (chronic kidney disease) stage 3, GFR 30-59 ml/min (H)     HPI   Chief Complaint   Patient presents with     Ear Problem     ear wax remomval      Face to face encounter was January 2019 so it has been awhile.   GFR Estimate   Date Value Ref Range Status   11/27/2018 49 (L) >60 mL/min/1.7m2 Final     Comment:     Non  GFR Calc   01/15/2018 43 (L) >60 mL/min/1.7m2 Final     Comment:     Non  GFR Calc   11/28/2017 48 (L) >60 mL/min/1.7m2 Final     Comment:     Non  GFR Calc   06/02/2017 48 (L) >60 mL/min/1.7m2 Final     Comment:     Non  GFR Calc   11/28/2016 48 (L) >60 mL/min/1.7m2 Final     Comment:     Non  GFR Calc     Chronic kidney disease stage 3     And has stable chronic lymphocytic leukemia and has chronic ongoing monitoring  With laboratory studies with Dr. Rodrígeuz Villarreal with [facility] . Dr. Villarreal is following this patient closely including every single 3 months he has an lactate dehydrogenase, complete blood count with differential, and comprehensive metabolic panel. We do receive also, all of these tests via faxes and are scanned into Epic electronic medical records      Chief complaint is bilateral ear wax impactions patient assumes.    Patient Active Problem List   Diagnosis     Hyperlipidemia with target LDL less than 100     Scrotal pain     Advanced directives, counseling/discussion     CKD (chronic kidney disease) stage 3, GFR 30-59 ml/min (H)     Chronic lymphocytic leukemia (H)     Xerosis cutis     Actinic keratoses     Elevated coronary artery calcium score     [place] cardiac risk 10-20% in next 10 years     Peripheral polyneuropathy     Anemia      Past Surgical History:   Procedure Laterality Date     CARPAL TUNNEL RELEASE RT/LT      left hand       Social History     Tobacco Use     Smoking status: Never Smoker     Smokeless tobacco: Never Used   Substance Use Topics     Alcohol use: Yes     Comment: rarely     Family History   Problem Relation Age of Onset     Cancer Brother         blood cancer     Liver Disease Father      Liver Disease Paternal Aunt         as well as a paternal cousin         Current Outpatient Medications   Medication Sig Dispense Refill     aspirin 81 MG tablet Take by mouth every 7 days        azithromycin (ZITHROMAX) 250 MG tablet Two tablets first day, then one tablet daily for four days. 6 tablet 0     diphenoxylate-atropine (LOMOTIL) 2.5-0.025 MG per tablet Take 2 tablets by mouth 4 times daily as needed for diarrhea 30 tablet 0     Glucosamine Sulfate 500 MG TABS Take 500 mg by mouth       Multiple Vitamins-Minerals (MULTIVITAMIN OR) Take 1 tablet by mouth daily.       Omega-3 Fatty Acids (OMEGA-3 FISH OIL PO)        Allergies   Allergen Reactions     Sulfa Drugs Hives and Swelling     Recent Labs   Lab Test 11/27/18  1041 01/15/18  1136  06/02/17  1448 11/28/16  1036  02/27/15  05/14/12  1415   A1C  --   --   --   --   --   --  6.0  --   --    LDL  --   --   --  148* 159*  --  144  --   --    HDL  --   --   --  64 75  --  70  --   --    TRIG  --   --   --  81 44  --  58  --   --    ALT  --  16  --   --   --   --  29  --  10   CR 1.39* 1.57*   < > 1.44* 1.44*   < > 1.30   < > 1.22   GFRESTIMATED 49* 43*   < > 48* 48*   < > 54   < > 58*   GFRESTBLACK 60* 52*   < > 58* 58*   < > 65   < > 71   POTASSIUM 4.2 4.0   < > 4.2 4.3   < > 5.2   < > 4.3   TSH  --   --   --  1.14  --   --  1.366  --   --     < > = values in this interval not displayed.      BP Readings from Last 3 Encounters:   06/29/20 (!) 143/65   01/03/19 126/74   11/27/18 123/68    Wt Readings from Last 3 Encounters:   06/29/20 74.8 kg (165 lb)   01/03/19 77.6 kg (171  lb)   11/27/18 78.9 kg (174 lb)              Reviewed and updated as needed this visit by Provider         Review of Systems   Constitutional, HEENT, cardiovascular, pulmonary, gi and gu systems are negative, except as otherwise noted.      Objective    BP (!) 143/65   Pulse 84   Temp 98.7  F (37.1  C) (Oral)   Resp 16   Wt 74.8 kg (165 lb)   SpO2 98%   BMI 25.84 kg/m    Body mass index is 25.84 kg/m .  Physical Exam   GENERAL: healthy, alert and no distress  EYES: Eyes grossly normal to inspection, PERRL and conjunctivae and sclerae normal  HENT: ear canals and TM's normal, nose and mouth without ulcers or lesions  NECK: no adenopathy, no asymmetry, masses, or scars and thyroid normal to palpation  MS: no gross musculoskeletal defects noted, no edema  NEURO: Normal strength and tone, mentation intact and speech normal  PSYCH: mentation appears normal, affect normal/bright    Diagnostic Test Results:  Labs reviewed in Epic        Assessment & Plan     (H61.23) Bilateral impacted cerumen  (primary encounter diagnosis)  Comment: successfully irrigated out by my medical assistant    Plan: REMOVE IMPACTED CERUMEN            (E78.5) Hyperlipidemia with target LDL less than 100  Comment: not to be pursued at this point   Plan:     (C91.10) Chronic lymphocytic leukemia (H)  Comment: ongoing monitoring  And treatment via [facility] group   Plan: see Epic electronic medical records     (D64.9) Anemia, unspecified type  Comment: as above   Plan:     (N18.3) CKD (chronic kidney disease) stage 3, GFR 30-59 ml/min (H)  Comment:  As above   Plan:          Return in about 1 year (around 6/29/2021).    Cali Goldstein MD  [facility]

## 2020-06-29 NOTE — TELEPHONE ENCOUNTER
This writer called the patient to offer a video visit due to Dr. Carpio not being in Clinic.  Advised that the patient could go to a [facility] lab close to his home for labs.  The patient declined a video visit due to technology challenges.  A telephone visit was offered and declined.  The patient requested the visit be cancelled.

## 2020-06-29 NOTE — TELEPHONE ENCOUNTER
Attempted to contact pt.  No answer.  Message left on voice mail that it s important you call back before your appointment scheduled with Dr. Carpio  [phone]       Need to discuss:  The situation surrounding COVID-19 (novel coronavirus) continues to evolve and changes occur rapidly. As a precautionary measure and to keep patients and team members safe, we are limiting non-essential visits to the clinic. We are giving patients the option to switch their in-clinic appointments to Video Visit.      Questioned if you would like to proceed with a Video Visit.    Pt needs Lab Only appt prior to Video Visit.    Rosalee Dhillon LPN

## 2020-06-30 NOTE — TELEPHONE ENCOUNTER
Returned call and spoke with pt.  Informed him that he will be contacted when he can be scheduled with Dr. Carpio in clinic.  Pt agreed with plan.  Encouraged pt to call if any further questions or concerns.    Rosalee Dhillon LPN

## 2020-08-28 ENCOUNTER — TRANSFERRED RECORDS (OUTPATIENT)
Dept: HEALTH INFORMATION MANAGEMENT | Facility: CLINIC | Age: 80
End: 2020-08-28

## 2020-10-09 ENCOUNTER — TRANSFERRED RECORDS (OUTPATIENT)
Dept: HEALTH INFORMATION MANAGEMENT | Facility: CLINIC | Age: 80
End: 2020-10-09

## 2020-10-12 ENCOUNTER — TELEPHONE (OUTPATIENT)
Dept: NEPHROLOGY | Facility: CLINIC | Age: 80
End: 2020-10-12

## 2020-10-12 NOTE — TELEPHONE ENCOUNTER
[facility] Call Center    Phone Message    May a detailed message be left on voicemail: yes     Reason for Call: Patient called wanting to discuss chemo and antibodies that his Oncologist wants to start giving him. Just had blood work for his Oncologist on 10/9/2020. Wants Dr. Carpio's advice. Please advise. Thank you.    Action Taken: Message routed to:  Adult Clinics: Nephrology p [phone]    Travel Screening: Not Applicable

## 2020-10-16 NOTE — TELEPHONE ENCOUNTER
Spoke to patient. His oncologist is through [facility]- [place] location. His name is Dr. Villarreal. Dr. Villarreal suggested that Santiago have a combination of treatments including Rituximab and Bendamustine for treatment of his CLL. Santiago had labs done at [facility] 10/9/20, RN does not have these results to review at this time.     Scheduled follow up telephone visit with Dr. Chahal for 10/26. Advised that I would check with Dr. Carpio to see if additional lab work is needed prior to his visit. He does have some lab work in August 2020 scanned in note from [facility].     Will plan to be in touch with patient regarding any further recommendations prior to 10/26.    Georgia Valverde RN, BSN  Nephrology Care Coordinator  [facility]

## 2020-10-20 NOTE — TELEPHONE ENCOUNTER
Left message for [facility] Medical Records Department that Dr. Carpio is requesting last office note from Dr. Villarreal and lab results dated 10/9/2020.    Advised a return call to discuss if unable to do so. Provided fax number of [phone] to have information faxed to in preparation for patient's appointment next week.    Informed Kilo that we have requested the note and lab. He understood and will plan to discuss this further next week.     Fax cover sheet sent to [phone] as well requesting this information.    Georgia Valverde, RN, BSN  Nephrology Care Coordinator  [facility]

## 2020-10-26 ENCOUNTER — VIRTUAL VISIT (OUTPATIENT)
Dept: NEPHROLOGY | Facility: CLINIC | Age: 80
End: 2020-10-26
Payer: MEDICARE

## 2020-10-26 DIAGNOSIS — R39.11 URINARY HESITANCY: Primary | ICD-10-CM

## 2020-10-26 DIAGNOSIS — N18.32 STAGE 3B CHRONIC KIDNEY DISEASE (H): ICD-10-CM

## 2020-10-26 PROCEDURE — 99214 OFFICE O/P EST MOD 30 MIN: CPT | Mod: 95 | Performed by: INTERNAL MEDICINE

## 2020-10-26 RX ORDER — TAMSULOSIN HYDROCHLORIDE 0.4 MG/1
0.4 CAPSULE ORAL DAILY
Qty: 90 CAPSULE | Refills: 1 | Status: SHIPPED | OUTPATIENT
Start: 2020-10-26 | End: 2021-04-19

## 2020-10-26 RX ORDER — CHOLECALCIFEROL (VITAMIN D3) 25 MCG
CAPSULE ORAL
COMMUNITY
End: 2021-11-09

## 2020-10-26 NOTE — PATIENT INSTRUCTIONS
1. Start flomax 0.4 mg daily  2. I encourage you to continue to have conversations with your oncologist regarding risks/benefits of treating your CLL. We are here to support you but monitoring of kidney function through the treatment will occur through your oncologist.   3. Plan 6 month follow-up to assure stability of your kidney function. Please call if any acute concerns/questions.  4. We will fax over lab orders to [facility] for your kidney function testing (creatinine and cystatin C).

## 2020-10-26 NOTE — PROGRESS NOTES
"Kilo Joya is a 80 year old male who is being evaluated via a billable telephone visit.      The patient has been notified of following:     \"This telephone visit will be conducted via a call between you and your physician/provider. We have found that certain health care needs can be provided without the need for a physical exam.  This service lets us provide the care you need with a short phone conversation.  If a prescription is necessary we can send it directly to your pharmacy.  If lab work is needed we can place an order for that and you can then stop by our lab to have the test done at a later time.    Telephone visits are billed at different rates depending on your insurance coverage. During this emergency period, for some insurers they may be billed the same as an in-person visit.  Please reach out to your insurance provider with any questions.    If during the course of the call the physician/provider feels a telephone visit is not appropriate, you will not be charged for this service.\"    Patient has given verbal consent for Telephone visit?  Yes    What phone number would you like to be contacted at? [phone]    How would you like to obtain your AVS? Mail a copy     Rosalee Dhiloln LPN    Phone call duration: 40 minutes    Eliza Luna MD      October 26, 2020    CC: CKD follow up    [place] :  Derick Joya is a 78 year old male who presents for follow-up of CKD.  On review of risk factors for CKD, his work did expose him to various solutions such as methanol, acetone, and various metals at times. No other significant risk factors. He is followed by Dr. Robles at [facility] for his CLL but has not had treatment so far, but his counts are increasing with increasing fatigue. - just following for now. Creatinine has been 1.43-1.67 in the past year; 1.67 recently; no h/o proteinuria. BP at goal. He has no pitting edema - swelling is always better in the AM - he wears TEDs for his mild edema " concerns. He denies burning or pain with urination but has trouble urinating, incompletely empty the bladder and also endorse nocturia. Endorsed 20 pound weight loss in past 18 months since his wife passed away (also decreased PO intake). He is trying to eat better now. Denied other systemic symptoms including fever/chills, nausea/vomiting, abdominal pain, chest pain or SOB, dizziness/lightheadedness.    Not checking BP at home, last checked at onc clinic which showed BP of 130/56 on 10/9/2020.    - History of Hematuria: no  - Swelling: present, all the time wear compression stockings, may be a little bit worse with out stockings also get worse with walking, which is little more than before. Broke right ankle 35 years ago, so likely had more swelling around right ankle vs left one. Denied pain/redness in LE.   - Hx of UTIs: no   - Hx of stones: no   - Rashes/Joint pain: no   - Family hx of kidney disease:  paternal aunt had renal disease but does not know the disease   - NSAID use: no       Allergies   Allergen Reactions     Sulfa Drugs Hives and Swelling            Cholecalciferol (VITAMIN D-3) 25 MCG (1000 UT) CAPS,        Glucosamine Sulfate 500 MG TABS, Take 500 mg by mouth       Multiple Vitamins-Minerals (MULTIVITAMIN OR), Take 1 tablet by mouth daily.       aspirin 81 MG tablet, Take by mouth every 7 days        azithromycin (ZITHROMAX) 250 MG tablet, Two tablets first day, then one tablet daily for four days. (Patient not taking: Reported on 10/26/2020)       diphenoxylate-atropine (LOMOTIL) 2.5-0.025 MG per tablet, Take 2 tablets by mouth 4 times daily as needed for diarrhea (Patient not taking: Reported on 10/26/2020)       Omega-3 Fatty Acids (OMEGA-3 FISH OIL PO),     No current facility-administered medications on file prior to visit.       Past Medical History:   Diagnosis Date     Ankle fracture, right      CLL (chronic lymphocytic leukaemia)      Left knee pain     takes glucosamine     Shingles         Past Surgical History:   Procedure Laterality Date     CARPAL TUNNEL RELEASE RT/LT      left hand       Social History     Tobacco Use     Smoking status: Never Smoker     Smokeless tobacco: Never Used   Substance Use Topics     Alcohol use: Yes     Comment: rarely     Drug use: No       Family History   Problem Relation Age of Onset     Cancer Brother         blood cancer     Liver Disease Father      Liver Disease Paternal Aunt         as well as a paternal cousin       ROS: A 12 system review of systems was negative other than noted here or above.     Exam:  There were no vitals taken for this visit.    Was unable to do physical exam in setting of telephone visit 2/2 COVID 19 pandemic    Results:    No visits with results within 1 Day(s) from this visit.   Latest known visit with results is:   Office Visit on 11/27/2018   Component Date Value Ref Range Status     PSA 11/27/2018 1.75  0 - 4 ug/L Final    Assay Method:  Chemiluminescence using Siemens Vista analyzer     Color Urine 11/27/2018 Light Yellow   Final     Appearance Urine 11/27/2018 Clear   Final     Glucose Urine 11/27/2018 Negative  NEG^Negative mg/dL Final     Bilirubin Urine 11/27/2018 Negative  NEG^Negative Final     Ketones Urine 11/27/2018 Negative  NEG^Negative mg/dL Final     Specific Gravity Urine 11/27/2018 1.011  1.003 - 1.035 Final     Blood Urine 11/27/2018 Negative  NEG^Negative Final     pH Urine 11/27/2018 5.5  5.0 - 7.0 pH Final     Protein Albumin Urine 11/27/2018 Negative  NEG^Negative mg/dL Final     Urobilinogen mg/dL 11/27/2018 Normal  0.0 - 2.0 mg/dL Final     Nitrite Urine 11/27/2018 Negative  NEG^Negative Final     Leukocyte Esterase Urine 11/27/2018 Negative  NEG^Negative Final     Source 11/27/2018 Midstream Urine   Final       Assessment/Plan:   1. CKD Stage 3: He does not have an obvious etiology of his CKD. He does have a hx of possible exposure to various metals in the past - unclear whether he had lead or other  metal exposure causing kidney injury in the past. He has not had any evidence of hematuria/proteinuria and his kidney function has been stable with creatinine of 1.4-1.67. With recent weight loss, will repeat his cystatin C and BMP for comparing his eGFR (will try to fax over his lab work to oncology clinic in hope that they can draw blood samples with his next visit).     2. CLL: followed by Dr. Robles at [facility] - monitoring without therapy at this point in time, but likely to get therapy in next coming weeks (likely treatment is with Bendamustine and Rituximab).     3. Anemia: hemoglobin 11.4 at this time      4. Difficulty with urination: increased urination along with nocturia, also feels like he is incompletely emptying his bladder. Historically PVR was negative, will initiate on alpha blocker.  - Flomax 0.4 mg daily    5.LE swelling, chronic with recent mild worsening. Swelling improves with stockings. Most likely venous insufficiency, but other possibility is decreased renal function (but stable creatinine so far). That being said, no other symptoms of volume overload (no PND or orthopnea), so will hold off on any diuretics at this point. As mentioned above, will get labs including Cystatic C to estimate his renal function.    Eliza Luna  Nephrology fellow    Plan discussed with Dr. Carpio    Attending Note: I have seen and examined this patient and the above note reflects my historical findings, exam findings, and assessment and plan.   Patient Instructions   1. Start flomax 0.4 mg daily  2. I encourage you to continue to have conversations with your oncologist regarding risks/benefits of treating your CLL. We are here to support you but monitoring of kidney function through the treatment will occur through your oncologist.   3. Plan 6 month follow-up to assure stability of your kidney function. Please call if any acute concerns/questions.  4. We will fax over lab orders to [facility] for your  kidney function testing (creatinine and cystatin C).    Anjana Carpio, DO

## 2020-10-30 NOTE — PROGRESS NOTES
Lab orders faxed to [facility] 10/30/2020 for  Cystatin C and renal panel.    Georgia Valverde, RN, BSN  Nephrology Care Coordinator  [facility]

## 2020-11-03 ENCOUNTER — TRANSFERRED RECORDS (OUTPATIENT)
Dept: HEALTH INFORMATION MANAGEMENT | Facility: CLINIC | Age: 80
End: 2020-11-03

## 2020-11-18 ENCOUNTER — TRANSFERRED RECORDS (OUTPATIENT)
Dept: HEALTH INFORMATION MANAGEMENT | Facility: CLINIC | Age: 80
End: 2020-11-18

## 2020-11-24 ENCOUNTER — TRANSFERRED RECORDS (OUTPATIENT)
Dept: HEALTH INFORMATION MANAGEMENT | Facility: CLINIC | Age: 80
End: 2020-11-24

## 2020-12-10 ENCOUNTER — TRANSFERRED RECORDS (OUTPATIENT)
Dept: HEALTH INFORMATION MANAGEMENT | Facility: CLINIC | Age: 80
End: 2020-12-10

## 2021-01-07 ENCOUNTER — TRANSFERRED RECORDS (OUTPATIENT)
Dept: HEALTH INFORMATION MANAGEMENT | Facility: CLINIC | Age: 81
End: 2021-01-07

## 2021-02-04 ENCOUNTER — TRANSFERRED RECORDS (OUTPATIENT)
Dept: HEALTH INFORMATION MANAGEMENT | Facility: CLINIC | Age: 81
End: 2021-02-04

## 2021-03-08 ENCOUNTER — IMMUNIZATION (OUTPATIENT)
Dept: PEDIATRICS | Facility: CLINIC | Age: 81
End: 2021-03-08
Payer: MEDICARE

## 2021-03-08 PROCEDURE — 0001A PR COVID VAC PFIZER DIL RECON 30 MCG/0.3 ML IM: CPT

## 2021-03-08 PROCEDURE — 91300 PR COVID VAC PFIZER DIL RECON 30 MCG/0.3 ML IM: CPT

## 2021-03-12 ENCOUNTER — OFFICE VISIT (OUTPATIENT)
Dept: INTERNAL MEDICINE | Facility: CLINIC | Age: 81
End: 2021-03-12
Payer: MEDICARE

## 2021-03-12 VITALS
BODY MASS INDEX: 26.63 KG/M2 | DIASTOLIC BLOOD PRESSURE: 72 MMHG | RESPIRATION RATE: 16 BRPM | OXYGEN SATURATION: 99 % | TEMPERATURE: 97.9 F | WEIGHT: 170 LBS | SYSTOLIC BLOOD PRESSURE: 140 MMHG | HEART RATE: 86 BPM

## 2021-03-12 DIAGNOSIS — N18.32 STAGE 3B CHRONIC KIDNEY DISEASE (H): ICD-10-CM

## 2021-03-12 DIAGNOSIS — E78.5 HYPERLIPIDEMIA WITH TARGET LDL LESS THAN 100: ICD-10-CM

## 2021-03-12 DIAGNOSIS — C91.10 CHRONIC LYMPHOCYTIC LEUKEMIA (H): ICD-10-CM

## 2021-03-12 DIAGNOSIS — H61.23 BILATERAL IMPACTED CERUMEN: ICD-10-CM

## 2021-03-12 DIAGNOSIS — D64.9 ANEMIA, UNSPECIFIED TYPE: ICD-10-CM

## 2021-03-12 DIAGNOSIS — I82.721: ICD-10-CM

## 2021-03-12 DIAGNOSIS — Z00.00 ENCOUNTER FOR SUBSEQUENT ANNUAL WELLNESS VISIT (AWV) IN MEDICARE PATIENT: Primary | ICD-10-CM

## 2021-03-12 LAB
ALBUMIN SERPL-MCNC: 3.7 G/DL (ref 3.4–5)
ALP SERPL-CCNC: 83 U/L (ref 40–150)
ALT SERPL W P-5'-P-CCNC: 20 U/L (ref 0–70)
ANION GAP SERPL CALCULATED.3IONS-SCNC: 2 MMOL/L (ref 3–14)
AST SERPL W P-5'-P-CCNC: 14 U/L (ref 0–45)
BILIRUB SERPL-MCNC: 0.2 MG/DL (ref 0.2–1.3)
BUN SERPL-MCNC: 42 MG/DL (ref 7–30)
CALCIUM SERPL-MCNC: 9.2 MG/DL (ref 8.5–10.1)
CHLORIDE SERPL-SCNC: 104 MMOL/L (ref 94–109)
CHOLEST SERPL-MCNC: 222 MG/DL
CO2 SERPL-SCNC: 31 MMOL/L (ref 20–32)
CREAT SERPL-MCNC: 2 MG/DL (ref 0.66–1.25)
GFR SERPL CREATININE-BSD FRML MDRD: 30 ML/MIN/{1.73_M2}
GLUCOSE SERPL-MCNC: 89 MG/DL (ref 70–99)
HDLC SERPL-MCNC: 73 MG/DL
LDLC SERPL CALC-MCNC: 131 MG/DL
NONHDLC SERPL-MCNC: 149 MG/DL
POTASSIUM SERPL-SCNC: 4.6 MMOL/L (ref 3.4–5.3)
PROT SERPL-MCNC: 6.8 G/DL (ref 6.8–8.8)
SODIUM SERPL-SCNC: 137 MMOL/L (ref 133–144)
TRIGL SERPL-MCNC: 89 MG/DL

## 2021-03-12 PROCEDURE — 69209 REMOVE IMPACTED EAR WAX UNI: CPT | Performed by: INTERNAL MEDICINE

## 2021-03-12 PROCEDURE — 80053 COMPREHEN METABOLIC PANEL: CPT | Performed by: INTERNAL MEDICINE

## 2021-03-12 PROCEDURE — 36415 COLL VENOUS BLD VENIPUNCTURE: CPT | Performed by: INTERNAL MEDICINE

## 2021-03-12 PROCEDURE — G0438 PPPS, INITIAL VISIT: HCPCS | Performed by: INTERNAL MEDICINE

## 2021-03-12 PROCEDURE — 80061 LIPID PANEL: CPT | Performed by: INTERNAL MEDICINE

## 2021-03-12 ASSESSMENT — ACTIVITIES OF DAILY LIVING (ADL): CURRENT_FUNCTION: NO ASSISTANCE NEEDED

## 2021-03-12 NOTE — PROGRESS NOTES
"SUBJECTIVE:   Kilo Joya is a 81 year old male who presents for Preventive Visit.      Patient has been advised of split billing requirements and indicates understanding: Yes   Are you in the first 12 months of your Medicare coverage?  No    Healthy Habits:     In general, how would you rate your overall health?  Good    Frequency of exercise:  4-5 days/week    Duration of exercise:  15-30 minutes    Do you usually eat at least 4 servings of fruit and vegetables a day, include whole grains    & fiber and avoid regularly eating high fat or \"junk\" foods?  Yes    Taking medications regularly:  Yes    Barriers to taking medications:  None    Medication side effects:  None    Ability to successfully perform activities of daily living:  No assistance needed    Home Safety:  No safety concerns identified    Hearing Impairment:  No hearing concerns    In the past 6 months, have you been bothered by leaking of urine? Yes    In general, how would you rate your overall mental or emotional health?  Good      PHQ-2 Total Score: 0    Additional concerns today:  No    He uses a pad for bladder protection from a bit of leaking.  Benign prostatic hyperplasia issues are reviewed     Do you feel safe in your environment? Yes    Have you ever done Advance Care Planning? (For example, a Health Directive, POLST, or a discussion with a medical provider or your loved ones about your wishes): Yes, advance care planning is on file.    Encounter Diagnoses   Name Primary?     Encounter for subsequent annual wellness visit (AWV) in Medicare patient Yes     Bilateral impacted cerumen      Chronic lymphocytic leukemia (H)      Stage 3b chronic kidney disease      Chronic deep vein thrombosis (DVT) of radial vein of right upper extremity (H)      Anemia, unspecified type      Hyperlipidemia with target LDL less than 100      BP Readings from Last 6 Encounters:   03/12/21 (!) 140/72   06/29/20 (!) 143/65   01/03/19 126/74   11/27/18 123/68 "   01/15/18 96/64   11/28/17 136/73         Fall risk  Fallen 2 or more times in the past year?: No  Any fall with injury in the past year?: No    Cognitive Screening   1) Repeat 3 items (Leader, Season, Table)    2) Clock draw: NORMAL  3) 3 item recall: Recalls 2 objects   Results: NORMAL clock, 1-2 items recalled: COGNITIVE IMPAIRMENT LESS LIKELY    Mini-CogTM Copyright S Maria Eugenia. Licensed by the author for use in [employer]; reprinted with permission (yu@[facility]). All rights reserved.      Do you have sleep apnea, excessive snoring or daytime drowsiness?: no    Reviewed and updated as needed this visit by clinical staff  Tobacco  Allergies    Med Hx  Surg Hx  Fam Hx  Soc Hx        Reviewed and updated as needed this visit by Provider                Social History     Tobacco Use     Smoking status: Never Smoker     Smokeless tobacco: Never Used   Substance Use Topics     Alcohol use: Yes     Comment: rarely       Alcohol Use 11/16/2011   Prescreen: >3 drinks/day or >7 drinks/week? The patient does not drink >3 drinks per day nor >7 drinks per week.   No flowsheet data found.        Current providers sharing in care for this patient include:   Patient Care Team:  Cali Goldstein MD as PCP - General (Internal Medicine)  Vika Hernandez MD as PCP - oncology (Oncology)  Cali Goldstein MD as Assigned PCP  Anjana Carpio MD as Assigned Nephrology Provider    The following health maintenance items are reviewed in Epic and correct as of today:  Health Maintenance   Topic Date Due     ZOSTER IMMUNIZATION (1 of 2) Never done     MEDICARE ANNUAL WELLNESS VISIT  12/20/2011     ADVANCE CARE PLANNING  11/16/2016     FALL RISK ASSESSMENT  01/03/2020     INFLUENZA VACCINE (1) 09/01/2020     COVID-19 Vaccine (2 of 2 - Pfizer series) 03/29/2021     DTAP/TDAP/TD IMMUNIZATION (2 - Td) 01/31/2027     PHQ-2  Completed     Pneumococcal Vaccine: Pediatrics (0 to 5 Years) and At-Risk Patients (6 to 64 Years)   Completed     Pneumococcal Vaccine: 65+ Years  Completed     IPV IMMUNIZATION  Aged Out     MENINGITIS IMMUNIZATION  Aged Out     HEPATITIS B IMMUNIZATION  Aged Out     Lab work is in process  Labs reviewed in EPIC  BP Readings from Last 3 Encounters:   03/12/21 (!) 140/72   06/29/20 (!) 143/65   01/03/19 126/74    Wt Readings from Last 3 Encounters:   03/12/21 77.1 kg (170 lb)   06/29/20 74.8 kg (165 lb)   01/03/19 77.6 kg (171 lb)                  Patient Active Problem List   Diagnosis     Hyperlipidemia with target LDL less than 100     Scrotal pain     Advanced directives, counseling/discussion     CKD (chronic kidney disease) stage 3, GFR 30-59 ml/min     Chronic lymphocytic leukemia (H)     Xerosis cutis     Actinic keratoses     Elevated coronary artery calcium score     [place] cardiac risk 10-20% in next 10 years     Peripheral polyneuropathy     Anemia     Past Surgical History:   Procedure Laterality Date     CARPAL TUNNEL RELEASE RT/LT      left hand       Social History     Tobacco Use     Smoking status: Never Smoker     Smokeless tobacco: Never Used   Substance Use Topics     Alcohol use: Yes     Comment: rarely     Family History   Problem Relation Age of Onset     Cancer Brother         blood cancer     Liver Disease Father      Liver Disease Paternal Aunt         as well as a paternal cousin         Current Outpatient Medications   Medication Sig Dispense Refill     aspirin 81 MG tablet Take by mouth every 7 days        Cholecalciferol (VITAMIN D-3) 25 MCG (1000 UT) CAPS        Glucosamine Sulfate 500 MG TABS Take 500 mg by mouth       Multiple Vitamins-Minerals (MULTIVITAMIN OR) Take 1 tablet by mouth daily.       Omega-3 Fatty Acids (OMEGA-3 FISH OIL PO)        diphenoxylate-atropine (LOMOTIL) 2.5-0.025 MG per tablet Take 2 tablets by mouth 4 times daily as needed for diarrhea (Patient not taking: Reported on 3/12/2021) 30 tablet 0     tamsulosin (FLOMAX) 0.4 MG capsule Take 1 capsule (0.4  "mg) by mouth daily (Patient not taking: Reported on 3/12/2021) 90 capsule 1     Allergies   Allergen Reactions     Sulfa Drugs Hives and Swelling     Recent Labs   Lab Test 11/27/18  1041 01/15/18  1136 06/02/17  1448 06/02/17  1448 11/28/16  1036 02/27/15  0000 02/27/15   A1C  --   --   --   --   --   --  6.0   LDL  --   --   --  148* 159*  --  144   HDL  --   --   --  64 75  --  70   TRIG  --   --   --  81 44  --  58   ALT  --  16  --   --   --   --  29   CR 1.39* 1.57*   < > 1.44* 1.44*   < > 1.30   GFRESTIMATED 49* 43*   < > 48* 48*   < > 54   GFRESTBLACK 60* 52*   < > 58* 58*   < > 65   POTASSIUM 4.2 4.0   < > 4.2 4.3   < > 5.2   TSH  --   --   --  1.14  --   --  1.366    < > = values in this interval not displayed.      Review of Systems  Constitutional, HEENT, cardiovascular, pulmonary, gi and gu systems are negative, except as otherwise noted.    OBJECTIVE:   BP (!) 140/72   Pulse 86   Temp 97.9  F (36.6  C) (Oral)   Resp 16   Wt 77.1 kg (170 lb)   SpO2 99%   BMI 26.63 kg/m   Estimated body mass index is 26.63 kg/m  as calculated from the following:    Height as of 1/3/19: 1.702 m (5' 7\").    Weight as of this encounter: 77.1 kg (170 lb).  Physical Exam  GENERAL: healthy, alert and no distress. Comfortable , appears his stated age   EYES: Eyes grossly normal to inspection, PERRL and conjunctivae and sclerae normal  HENT: ear canals and TM's normal, nose and mouth without ulcers or lesions  NECK: no adenopathy, no asymmetry, masses, or scars and thyroid normal to palpation  RESP: lungs clear to auscultation - no rales, rhonchi or wheezes  CV: regular rate and rhythm, normal S1 S2, no S3 or S4, no murmur, click or rub, no peripheral edema and peripheral pulses strong  ABDOMEN: soft, nontender, no hepatosplenomegaly, no masses and bowel sounds normal  MS: no gross musculoskeletal defects noted, no edema  SKIN: no suspicious lesions or rashes  NEURO: Normal strength and tone, mentation intact and speech " normal  PSYCH: mentation appears normal, affect normal/bright    Diagnostic Test Results:  Labs reviewed in Epic    ASSESSMENT / PLAN:   (Z00.00) Encounter for subsequent annual wellness visit (AWV) in Medicare patient  (primary encounter diagnosis)  Comment: routine screening issues   Plan: see orders section of this encounter     (H61.23) Bilateral impacted cerumen  Comment: successfully irrigated out by my medical assistant    Plan: REMOVE IMPACTED CERUMEN            (C91.10) Chronic lymphocytic leukemia (H)  Comment: see office visit notes from [facility]   Plan: this patient has frequent complete blood count with differential and we simply do not need to order this one again    (N18.32) Stage 3b chronic kidney disease  Comment:   GFR Estimate   Date Value Ref Range Status   03/12/2021 30 (L) >60 mL/min/[1.73_m2] Final     Comment:     Non  GFR Calc  Starting 12/18/2018, serum creatinine based estimated GFR (eGFR) will be   calculated using the Chronic Kidney Disease Epidemiology Collaboration   (CKD-EPI) equation.     11/27/2018 49 (L) >60 mL/min/1.7m2 Final     Comment:     Non  GFR Calc   01/15/2018 43 (L) >60 mL/min/1.7m2 Final     Comment:     Non  GFR Calc     GFR Estimate If Black   Date Value Ref Range Status   03/12/2021 35 (L) >60 mL/min/[1.73_m2] Final     Comment:      GFR Calc  Starting 12/18/2018, serum creatinine based estimated GFR (eGFR) will be   calculated using the Chronic Kidney Disease Epidemiology Collaboration   (CKD-EPI) equation.     11/27/2018 60 (L) >60 mL/min/1.7m2 Final     Comment:      GFR Calc   01/15/2018 52 (L) >60 mL/min/1.7m2 Final     Comment:      GFR Calc       Plan: see separate laboratory results note    (I82.721) Chronic deep vein thrombosis (DVT) of radial vein of right upper extremity (H)  Comment: continue current plan of care    Plan: apixaban ANTICOAGULANT  "(ELIQUIS) 2.5 MG tablet            (D64.9) Anemia, unspecified type  Comment: stable phase of chronic illness   Plan: Comprehensive metabolic panel            (E78.5) Hyperlipidemia with target LDL less than 100  Comment: continue current plan of care    Plan: Lipid panel reflex to direct LDL Fasting              Patient has been advised of split billing requirements and indicates understanding: Yes  COUNSELING:  Reviewed preventive health counseling, as reflected in patient instructions    Estimated body mass index is 26.63 kg/m  as calculated from the following:    Height as of 1/3/19: 1.702 m (5' 7\").    Weight as of this encounter: 77.1 kg (170 lb).    Weight management plan: Discussed healthy diet and exercise guidelines    He reports that he has never smoked. He has never used smokeless tobacco.      Appropriate preventive services were discussed with this patient, including applicable screening as appropriate for cardiovascular disease, diabetes, osteopenia/osteoporosis, and glaucoma.  As appropriate for age/gender, discussed screening for colorectal cancer, prostate cancer, breast cancer, and cervical cancer. Checklist reviewing preventive services available has been given to the patient.    Reviewed patients plan of care and provided an AVS. The Basic Care Plan (routine screening as documented in Health Maintenance) for Kilo meets the Care Plan requirement. This Care Plan has been established and reviewed with the Patient.    Counseling Resources:  ATP IV Guidelines  Pooled Cohorts Equation Calculator  Breast Cancer Risk Calculator  Breast Cancer: Medication to Reduce Risk  FRAX Risk Assessment  ICSI Preventive Guidelines  Dietary Guidelines for Americans, 2010  [employer]'s MyPlate  ASA Prophylaxis  Lung CA Screening    Cali Goldstein MD  [facility]    Identified Health Risks:  "

## 2021-03-15 ENCOUNTER — TELEPHONE (OUTPATIENT)
Dept: INTERNAL MEDICINE | Facility: CLINIC | Age: 81
End: 2021-03-15

## 2021-03-15 DIAGNOSIS — N18.30 CKD (CHRONIC KIDNEY DISEASE) STAGE 3, GFR 30-59 ML/MIN (H): ICD-10-CM

## 2021-03-15 DIAGNOSIS — N28.9 DECREASED RENAL FUNCTION: Primary | ICD-10-CM

## 2021-03-15 NOTE — TELEPHONE ENCOUNTER
"Left message on voicemail for patient to return call to the clinic at [phone] to get results below  Future lab order placed for BMP    \"All of these tests are within acceptable limits except for the kidney function which is inching downwards and we don't want that.     A recheck of the renal function is recommended in 2 weeks and meanwhile, we need patient to      1. Push fluids , try for at the very least 4-6 glasses of liquid per day   2. Avoid all of the non-steroidal anti-inflammatory drugs      If things aren't better with the recheck basic metabolic panel in 2 weeks we would be requesting also a renal ultrasound study and a urine analysis and urine culture and Albumin random urine quantitative      Cali Goldstein MD\"    Raymond Littlejohn RN  [facility]    "

## 2021-03-17 NOTE — TELEPHONE ENCOUNTER
Patient notified of Provider's message as written.  Patient verbalized understanding.  Lab appointment scheduled for 3/31/2021  He has a f/u appointment with Dr. Carpio, his nephrologist on 4/19/2021 as well    Raymond Littlejohn RN  [facility]

## 2021-03-29 ENCOUNTER — IMMUNIZATION (OUTPATIENT)
Dept: PEDIATRICS | Facility: CLINIC | Age: 81
End: 2021-03-29
Attending: INTERNAL MEDICINE
Payer: MEDICARE

## 2021-03-29 PROCEDURE — 0002A PR COVID VAC PFIZER DIL RECON 30 MCG/0.3 ML IM: CPT

## 2021-03-29 PROCEDURE — 91300 PR COVID VAC PFIZER DIL RECON 30 MCG/0.3 ML IM: CPT

## 2021-03-31 DIAGNOSIS — N18.30 CKD (CHRONIC KIDNEY DISEASE) STAGE 3, GFR 30-59 ML/MIN (H): ICD-10-CM

## 2021-03-31 DIAGNOSIS — N28.9 DECREASED RENAL FUNCTION: ICD-10-CM

## 2021-03-31 LAB
ANION GAP SERPL CALCULATED.3IONS-SCNC: 3 MMOL/L (ref 3–14)
BUN SERPL-MCNC: 33 MG/DL (ref 7–30)
CALCIUM SERPL-MCNC: 9.1 MG/DL (ref 8.5–10.1)
CHLORIDE SERPL-SCNC: 101 MMOL/L (ref 94–109)
CO2 SERPL-SCNC: 32 MMOL/L (ref 20–32)
CREAT SERPL-MCNC: 1.79 MG/DL (ref 0.66–1.25)
GFR SERPL CREATININE-BSD FRML MDRD: 35 ML/MIN/{1.73_M2}
GLUCOSE SERPL-MCNC: 102 MG/DL (ref 70–99)
POTASSIUM SERPL-SCNC: 4.7 MMOL/L (ref 3.4–5.3)
SODIUM SERPL-SCNC: 136 MMOL/L (ref 133–144)

## 2021-03-31 PROCEDURE — 36415 COLL VENOUS BLD VENIPUNCTURE: CPT | Performed by: INTERNAL MEDICINE

## 2021-03-31 PROCEDURE — 80048 BASIC METABOLIC PNL TOTAL CA: CPT | Performed by: INTERNAL MEDICINE

## 2021-03-31 NOTE — LETTER
April 1, 2021    Kilo Joya  [address] [place] [address] [place]  [facility] [id number]          Dear ,    We are writing to inform you of your test results.  Improving kidney function.       Resulted Orders   **Basic metabolic panel FUTURE 14d   Result Value Ref Range    Sodium 136 133 - 144 mmol/L    Potassium 4.7 3.4 - 5.3 mmol/L    Chloride 101 94 - 109 mmol/L    Carbon Dioxide 32 20 - 32 mmol/L    Anion Gap 3 3 - 14 mmol/L    Glucose 102 (H) 70 - 99 mg/dL    Urea Nitrogen 33 (H) 7 - 30 mg/dL    Creatinine 1.79 (H) 0.66 - 1.25 mg/dL    GFR Estimate 35 (L) >60 mL/min/[1.73_m2]      Comment:      Non  GFR Calc  Starting 12/18/2018, serum creatinine based estimated GFR (eGFR) will be   calculated using the Chronic Kidney Disease Epidemiology Collaboration   (CKD-EPI) equation.      GFR Estimate If Black 40 (L) >60 mL/min/[1.73_m2]      Comment:       GFR Calc  Starting 12/18/2018, serum creatinine based estimated GFR (eGFR) will be   calculated using the Chronic Kidney Disease Epidemiology Collaboration   (CKD-EPI) equation.      Calcium 9.1 8.5 - 10.1 mg/dL       If you have any questions or concerns, please call the clinic at the number listed above.       Sincerely,      Scarlett George, CNP

## 2021-04-01 ENCOUNTER — TRANSFERRED RECORDS (OUTPATIENT)
Dept: HEALTH INFORMATION MANAGEMENT | Facility: CLINIC | Age: 81
End: 2021-04-01

## 2021-04-01 NOTE — RESULT ENCOUNTER NOTE
Dear Kilo,    Your recent test results are attached.      Improving kidney function.    If you have any questions please feel free to contact [phone] or myself via [employer].    Sincerely,  Scarlett George, CNP

## 2021-04-07 DIAGNOSIS — N18.32 STAGE 3B CHRONIC KIDNEY DISEASE (H): Primary | ICD-10-CM

## 2021-04-19 ENCOUNTER — OFFICE VISIT (OUTPATIENT)
Dept: NEPHROLOGY | Facility: CLINIC | Age: 81
End: 2021-04-19
Payer: MEDICARE

## 2021-04-19 VITALS
BODY MASS INDEX: 25.84 KG/M2 | SYSTOLIC BLOOD PRESSURE: 127 MMHG | DIASTOLIC BLOOD PRESSURE: 75 MMHG | HEART RATE: 71 BPM | WEIGHT: 165 LBS | OXYGEN SATURATION: 98 %

## 2021-04-19 DIAGNOSIS — R33.9 URINARY RETENTION: ICD-10-CM

## 2021-04-19 DIAGNOSIS — N18.32 STAGE 3B CHRONIC KIDNEY DISEASE (H): ICD-10-CM

## 2021-04-19 DIAGNOSIS — N18.32 STAGE 3B CHRONIC KIDNEY DISEASE (H): Primary | ICD-10-CM

## 2021-04-19 DIAGNOSIS — Z12.5 ENCOUNTER FOR SCREENING FOR MALIGNANT NEOPLASM OF PROSTATE: ICD-10-CM

## 2021-04-19 LAB
ALBUMIN SERPL-MCNC: 3.7 G/DL (ref 3.4–5)
ANION GAP SERPL CALCULATED.3IONS-SCNC: 3 MMOL/L (ref 3–14)
BUN SERPL-MCNC: 37 MG/DL (ref 7–30)
CALCIUM SERPL-MCNC: 9.2 MG/DL (ref 8.5–10.1)
CHLORIDE SERPL-SCNC: 104 MMOL/L (ref 94–109)
CO2 SERPL-SCNC: 31 MMOL/L (ref 20–32)
CREAT SERPL-MCNC: 2.08 MG/DL (ref 0.66–1.25)
CREAT UR-MCNC: 76 MG/DL
GFR SERPL CREATININE-BSD FRML MDRD: 29 ML/MIN/{1.73_M2}
GLUCOSE SERPL-MCNC: 104 MG/DL (ref 70–99)
HGB BLD-MCNC: 13.4 G/DL (ref 13.3–17.7)
PHOSPHATE SERPL-MCNC: 3.8 MG/DL (ref 2.5–4.5)
POTASSIUM SERPL-SCNC: 4.3 MMOL/L (ref 3.4–5.3)
PROT UR-MCNC: 0.07 G/L
PROT/CREAT 24H UR: 0.09 G/G CR (ref 0–0.2)
PSA SERPL-ACNC: 1.08 UG/L (ref 0–4)
PTH-INTACT SERPL-MCNC: 36 PG/ML (ref 18–80)
SODIUM SERPL-SCNC: 138 MMOL/L (ref 133–144)

## 2021-04-19 PROCEDURE — 85018 HEMOGLOBIN: CPT | Performed by: INTERNAL MEDICINE

## 2021-04-19 PROCEDURE — 99215 OFFICE O/P EST HI 40 MIN: CPT | Performed by: INTERNAL MEDICINE

## 2021-04-19 PROCEDURE — 83970 ASSAY OF PARATHORMONE: CPT | Performed by: INTERNAL MEDICINE

## 2021-04-19 PROCEDURE — G0103 PSA SCREENING: HCPCS | Performed by: INTERNAL MEDICINE

## 2021-04-19 PROCEDURE — 36415 COLL VENOUS BLD VENIPUNCTURE: CPT | Performed by: INTERNAL MEDICINE

## 2021-04-19 PROCEDURE — 80069 RENAL FUNCTION PANEL: CPT | Performed by: INTERNAL MEDICINE

## 2021-04-19 PROCEDURE — 84156 ASSAY OF PROTEIN URINE: CPT | Performed by: INTERNAL MEDICINE

## 2021-04-19 ASSESSMENT — PAIN SCALES - GENERAL: PAINLEVEL: NO PAIN (0)

## 2021-04-19 NOTE — PATIENT INSTRUCTIONS
1. Retrial flomax 0.4 mg daily - low threshold to stop it if any side effects.  2. Bladder Scan today before leaving.   3. Follow-up in one year as a tentative plan.   4. We will determine if you need a urology referral based on the bladder scan, PSA, and symptoms after retrialing flomax.

## 2021-05-19 NOTE — PROGRESS NOTES
4/19/21  [place] :  Dercik Joya is a 81 year old male who presents for follow-up of CKD.  On review of risk factors for CKD, his work did expose him to various solutions such as methanol, acetone, and various metals at times. No other significant risk factors. He is followed by Dr. Robles at [facility] for his CLL but has not had treatment so far, but his counts are increasing with increasing fatigue. - just following for now. Creatinine has been 1.43-1.67 in the past year; 1.67 recently; no h/o proteinuria. BP at goal. He has no pitting edema - swelling is always better in the AM - he wears TEDs for his mild edema concerns. He denies burning or pain with urination but has trouble urinating, incompletely empty the bladder and also endorse nocturia. Endorsed 20 pound weight loss in past 18 months since his wife passed away (also decreased PO intake). He is trying to eat better now. Denied other systemic symptoms including fever/chills, nausea/vomiting, abdominal pain, chest pain or SOB, dizziness/lightheadedness.     Not checking BP at home, last checked at onc clinic which showed BP of 130/56 on 10/9/2020.     - History of Hematuria: no  - Swelling: present, all the time wear compression stockings, may be a little bit worse with out stockings also get worse with walking, which is little more than before. Broke right ankle 35 years ago, so likely had more swelling around right ankle vs left one. Denied pain/redness in LE.   - Hx of UTIs: no   - Hx of stones: no   - Rashes/Joint pain: no   - Family hx of kidney disease:  paternal aunt had renal disease but does not know the disease   - NSAID use: no    4/19/21: Much variability of kidney function recently. He received bendamustine and rituximab on 11/12/20 - difficulty with fatigue, decreased appetite and thrombus. Eating and drinking well now. No NSAID use.  He is off of ASA - was on 325 mg daily daily for at least a month. He reports feeling sick for 2 weeks post  bendamustine. He was eating but felt terrible; no diarrhea. He spent 1/2 his time in bed; trying to stay hydrated. Creatianine has been 1.79-2.08 since March.      apixaban ANTICOAGULANT (ELIQUIS) 2.5 MG tablet, Take 1 tablet (2.5 mg) by mouth 2 times daily  Glucosamine Sulfate 500 MG TABS, Take 500 mg by mouth daily as needed   Cholecalciferol (VITAMIN D-3) 25 MCG (1000 UT) CAPS,   Multiple Vitamins-Minerals (MULTIVITAMIN OR), Take 1 tablet by mouth daily.  Omega-3 Fatty Acids (OMEGA-3 FISH OIL PO),     No current facility-administered medications on file prior to visit.       Exam:  /75 (BP Location: Left arm, Patient Position: Sitting, Cuff Size: Adult Regular)   Pulse 71   Wt 74.8 kg (165 lb)   SpO2 98%   BMI 25.84 kg/m    GENERAL APPEARANCE: alert and no distress    Results:    Office Visit on 04/19/2021   Component Date Value Ref Range Status     PSA 04/19/2021 1.08  0 - 4 ug/L Final    Assay Method:  Chemiluminescence using Siemens Vista analyzer   Orders Only on 04/19/2021   Component Date Value Ref Range Status     Sodium 04/19/2021 138  133 - 144 mmol/L Final     Potassium 04/19/2021 4.3  3.4 - 5.3 mmol/L Final     Chloride 04/19/2021 104  94 - 109 mmol/L Final     Carbon Dioxide 04/19/2021 31  20 - 32 mmol/L Final     Anion Gap 04/19/2021 3  3 - 14 mmol/L Final     Glucose 04/19/2021 104* 70 - 99 mg/dL Final     Urea Nitrogen 04/19/2021 37* 7 - 30 mg/dL Final     Creatinine 04/19/2021 2.08* 0.66 - 1.25 mg/dL Final     GFR Estimate 04/19/2021 29* >60 mL/min/[1.73_m2] Final    Comment: Non  GFR Calc  Starting 12/18/2018, serum creatinine based estimated GFR (eGFR) will be   calculated using the Chronic Kidney Disease Epidemiology Collaboration   (CKD-EPI) equation.       GFR Estimate If Black 04/19/2021 34* >60 mL/min/[1.73_m2] Final    Comment:  GFR Calc  Starting 12/18/2018, serum creatinine based estimated GFR (eGFR) will be   calculated using the Chronic Kidney  Disease Epidemiology Collaboration   (CKD-EPI) equation.       Calcium 04/19/2021 9.2  8.5 - 10.1 mg/dL Final     Phosphorus 04/19/2021 3.8  2.5 - 4.5 mg/dL Final     Albumin 04/19/2021 3.7  3.4 - 5.0 g/dL Final     Protein Random Urine 04/19/2021 0.07  g/L Final     Protein Total Urine g/gr Creatinine 04/19/2021 0.09  0 - 0.2 g/g Cr Final     Parathyroid Hormone Intact 04/19/2021 36  18 - 80 pg/mL Final     Hemoglobin 04/19/2021 13.4  13.3 - 17.7 g/dL Final     Creatinine Urine 04/19/2021 76  mg/dL Final      Lab results were reviewed and interpreted.   Outside lab results:  8/20/20: 1.67  Nov 2020: 1.55  Dec 2020: 1.84  Jan 2021: 1.7  March 21,2021: 2  March 31, 2021: 1.79  April 2021: 2.08  April 1, 2021: 1.7    Assessment/Plan:   1. CKD Stage 3: He does not have an obvious etiology of his CKD. He does have a hx of possible exposure to various metals in the past - unclear whether he had lead or other metal exposure causing kidney injury in the past. He has not had any evidence of hematuria/proteinuria. . More recently rise in creatinine in the past year.      2. CLL: followed by Dr. Robles at [facility]      3. Difficulty with urination:retrialing flomax - low threshold to see urology if sxs do not improve.        Patient Instructions   1. Retrial flomax 0.4 mg daily - low threshold to stop it if any side effects.  2. Bladder Scan today before leaving.   3. Follow-up in one year as a tentative plan.   4. We will determine if you need a urology referral based on the bladder scan, PSA, and symptoms after retrialing flomax.        41 minutes spent on the date of the encounter doing chart review, review of test results, interpretation of tests, patient visit and documentation     Anjana Carpio DO

## 2021-05-27 ENCOUNTER — TRANSFERRED RECORDS (OUTPATIENT)
Dept: HEALTH INFORMATION MANAGEMENT | Facility: CLINIC | Age: 81
End: 2021-05-27

## 2021-07-22 ENCOUNTER — TRANSFERRED RECORDS (OUTPATIENT)
Dept: HEALTH INFORMATION MANAGEMENT | Facility: CLINIC | Age: 81
End: 2021-07-22

## 2021-11-09 ENCOUNTER — OFFICE VISIT (OUTPATIENT)
Dept: FAMILY MEDICINE | Facility: CLINIC | Age: 81
End: 2021-11-09
Payer: MEDICARE

## 2021-11-09 VITALS
DIASTOLIC BLOOD PRESSURE: 70 MMHG | HEIGHT: 67 IN | HEART RATE: 92 BPM | BODY MASS INDEX: 24.99 KG/M2 | WEIGHT: 159.2 LBS | OXYGEN SATURATION: 97 % | TEMPERATURE: 98.6 F | SYSTOLIC BLOOD PRESSURE: 130 MMHG

## 2021-11-09 DIAGNOSIS — H61.23 CERUMINOSIS, BILATERAL: Primary | ICD-10-CM

## 2021-11-09 DIAGNOSIS — Z23 HIGH PRIORITY FOR 2019-NCOV VACCINE: ICD-10-CM

## 2021-11-09 PROCEDURE — 0004A COVID-19,PF,PFIZER (12+ YRS): CPT | Performed by: PHYSICIAN ASSISTANT

## 2021-11-09 PROCEDURE — 91300 COVID-19,PF,PFIZER (12+ YRS): CPT | Performed by: PHYSICIAN ASSISTANT

## 2021-11-09 PROCEDURE — 99212 OFFICE O/P EST SF 10 MIN: CPT | Mod: 25 | Performed by: PHYSICIAN ASSISTANT

## 2021-11-09 ASSESSMENT — MIFFLIN-ST. JEOR: SCORE: 1385.76

## 2021-11-09 NOTE — NURSING NOTE
Ear exam showing wax occlusion completed by provider.  H202/H20 was placed in the bilateral ear(s) via irrigation tool: elephant ear.  An Russ, CMA

## 2021-11-09 NOTE — PROGRESS NOTES
"  Assessment & Plan     Ceruminosis, bilateral  - carbamide peroxide (DEBROX) 6.5 % otic solution; One drop each ear weekly        Return in about 3 months (around 2/9/2022) for follow up if symptoms persist, change or worsen.    Pranav Holder PA-C  [facility] DIANA Henderson is a 81 year old who presents for the following health issues  accompanied by his self.    HPI     Patient presents with:  Plugged Ears: x 2 weeks  Imm/Inj: COVID-19 VACCINE    Patient notes this happens every 7 months.  Preventative measures reviewed.     Review of Systems   Constitutional, HEENT, cardiovascular, pulmonary, gi and gu systems are negative, except as otherwise noted.      Objective    /70   Pulse 92   Temp 98.6  F (37  C) (Oral)   Ht 1.702 m (5' 7\")   Wt 72.2 kg (159 lb 3.2 oz)   SpO2 97%   BMI 24.93 kg/m    Body mass index is 24.93 kg/m .  Physical Exam   GENERAL: healthy, alert and no distress  HENT: normal cephalic/atraumatic and both ears: occluded with wax and Cleared with warm water irrigation to good effect and no sequelae.        "

## 2021-11-11 ENCOUNTER — TRANSFERRED RECORDS (OUTPATIENT)
Dept: HEALTH INFORMATION MANAGEMENT | Facility: CLINIC | Age: 81
End: 2021-11-11
Payer: MEDICARE

## 2022-03-10 ENCOUNTER — TRANSFERRED RECORDS (OUTPATIENT)
Dept: HEALTH INFORMATION MANAGEMENT | Facility: CLINIC | Age: 82
End: 2022-03-10
Payer: MEDICARE

## 2022-03-14 ENCOUNTER — OFFICE VISIT (OUTPATIENT)
Dept: FAMILY MEDICINE | Facility: CLINIC | Age: 82
End: 2022-03-14
Payer: MEDICARE

## 2022-03-14 VITALS
RESPIRATION RATE: 18 BRPM | OXYGEN SATURATION: 97 % | SYSTOLIC BLOOD PRESSURE: 136 MMHG | BODY MASS INDEX: 24.74 KG/M2 | HEIGHT: 67 IN | HEART RATE: 109 BPM | DIASTOLIC BLOOD PRESSURE: 86 MMHG | WEIGHT: 157.6 LBS

## 2022-03-14 DIAGNOSIS — Z86.718 PERSONAL HISTORY OF DVT (DEEP VEIN THROMBOSIS): ICD-10-CM

## 2022-03-14 DIAGNOSIS — E78.5 HYPERLIPIDEMIA WITH TARGET LDL LESS THAN 100: ICD-10-CM

## 2022-03-14 DIAGNOSIS — N18.32 STAGE 3B CHRONIC KIDNEY DISEASE (H): ICD-10-CM

## 2022-03-14 DIAGNOSIS — C91.10 CHRONIC LYMPHOCYTIC LEUKEMIA (H): ICD-10-CM

## 2022-03-14 DIAGNOSIS — G62.9 PERIPHERAL POLYNEUROPATHY: ICD-10-CM

## 2022-03-14 DIAGNOSIS — Z01.818 PREOP GENERAL PHYSICAL EXAM: Primary | ICD-10-CM

## 2022-03-14 PROCEDURE — 99214 OFFICE O/P EST MOD 30 MIN: CPT | Performed by: FAMILY MEDICINE

## 2022-03-14 ASSESSMENT — PAIN SCALES - GENERAL: PAINLEVEL: NO PAIN (0)

## 2022-03-14 NOTE — PROGRESS NOTES
[facility]  [address]  [place] [address]  Phone: [phone]  Primary Provider: Cali Goldstein  Pre-op Performing Provider: SCOTTY DAVIS      PREOPERATIVE EVALUATION:  Today's date: 3/14/2022    Kilo Joya is a 82 year old male who presents for a preoperative evaluation.    Surgical Information:  Surgery/Procedure: Cataracts   Surgery Location: [facility]   Surgeon: unknown   Surgery Date: unknown - but next week sometime   Time of Surgery: unknown   Where patient plans to recover: Other: unsure, might stay with his youngest son but worried about his low toilets   Fax number for surgical facility: [facility] ([phone])    Type of Anesthesia Anticipated: to be determined    Assessment & Plan     The proposed surgical procedure is considered LOW risk.    Preop general physical exam      Peripheral polyneuropathy  Stable     Stage 3b chronic kidney disease (H)  Stable     Chronic lymphocytic leukemia (H)  Sees Oncology and on Rituximab     Hyperlipidemia with target LDL less than 100      Personal history of DVT (deep vein thrombosis)  Off Eliquis last year            Risks and Recommendations:  The patient has the following additional risks and recommendations for perioperative complications:   - No identified additional risk factors other than previously addressed    Medication Instructions:  on Reuximab Infusions-was q 2 months -last one was on 3-10-22    RECOMMENDATION:  APPROVAL GIVEN to proceed with proposed procedure, without further diagnostic evaluation.    Review of external notes as documented above           Subjective     HPI related to upcoming procedure: Pt is scheduled for cataract surgery Right eye      Preop Questions 3/14/2022   1. Have you ever had a heart attack or stroke? No   2. Have you ever had surgery on your heart or blood vessels, such as a stent placement, a coronary artery bypass, or surgery on an artery in your  head, neck, heart, or legs? No   3. Do you have chest pain with activity? No   4. Do you have a history of  heart failure? No   5. Do you currently have a cold, bronchitis or symptoms of other infection? No   6. Do you have a cough, shortness of breath, or wheezing? No   7. Do you or anyone in your family have previous history of blood clots? YES -    8. Do you or does anyone in your family have a serious bleeding problem such as prolonged bleeding following surgeries or cuts? No   9. Have you ever had problems with anemia or been told to take iron pills? No   10. Have you had any abnormal blood loss such as black, tarry or bloody stools? No   11. Have you ever had a blood transfusion? No   12. Are you willing to have a blood transfusion if it is medically needed before, during, or after your surgery? Yes   13. Have you or any of your relatives ever had problems with anesthesia? No   14. Do you have sleep apnea, excessive snoring or daytime drowsiness? No   15. Do you have any artifical heart valves or other implanted medical devices like a pacemaker, defibrillator, or continuous glucose monitor? No   16. Do you have artificial joints? No   17. Are you allergic to latex? No     Health Care Directive:  Patient does not have a Health Care Directive or Living Will: Discussed advance care planning with patient; information given to patient to review.    Preoperative Review of :   reviewed - no record of controlled substances prescribed.      Status of Chronic Conditions:  Pt not on any meds    Review of Systems  CONSTITUTIONAL: NEGATIVE for fever, chills, change in weight  INTEGUMENTARY/SKIN: NEGATIVE for worrisome rashes, moles or lesions  EYES: NEGATIVE for vision changes or irritation  ENT/MOUTH: NEGATIVE for ear, mouth and throat problems  RESP: NEGATIVE for significant cough or SOB  CV: NEGATIVE for chest pain, palpitations or peripheral edema  GI: NEGATIVE for nausea, abdominal pain, heartburn, or change in  "bowel habits  : NEGATIVE for frequency, dysuria, or hematuria  MUSCULOSKELETAL: NEGATIVE for significant arthralgias or myalgia  NEURO: NEGATIVE for weakness, dizziness or paresthesias  ENDOCRINE: NEGATIVE for temperature intolerance, skin/hair changes  HEME: NEGATIVE for bleeding problems  PSYCHIATRIC: NEGATIVE for changes in mood or affect  Past medical history, family history, medications and social history reviewed today and updated in [facility].         Past Medical History:   Diagnosis Date     Ankle fracture, right      CLL (chronic lymphocytic leukaemia)      Left knee pain     takes glucosamine     Shingles      Past Surgical History:   Procedure Laterality Date     CARPAL TUNNEL RELEASE RT/LT      left hand     Current Outpatient Medications   Medication Sig Dispense Refill     Multiple Vitamins-Minerals (MULTIVITAMIN OR) Take 1 tablet by mouth daily.       carbamide peroxide (DEBROX) 6.5 % otic solution One drop each ear weekly (Patient not taking: Reported on 3/14/2022) 15 mL 1       Allergies   Allergen Reactions     Sulfa Drugs Hives and Swelling     Warfarin Rash     Xarelto [Rivaroxaban] Rash        Social History     Tobacco Use     Smoking status: Never Smoker     Smokeless tobacco: Never Used   Substance Use Topics     Alcohol use: Yes     Comment: rarely     Family History   Problem Relation Age of Onset     Cancer Brother         blood cancer     Liver Disease Father      Liver Disease Paternal Aunt         as well as a paternal cousin     History   Drug Use No         Objective     BP (!) 166/94 (BP Location: Right arm, Cuff Size: Adult Regular)   Pulse 109   Resp 18   Ht 1.69 m (5' 6.54\")   Wt 71.5 kg (157 lb 9.6 oz)   SpO2 97%   BMI 25.03 kg/m      Physical Exam    GENERAL APPEARANCE: healthy, alert and no distress     EYES: EOMI, PERRL     HENT: ear canals and TM's normal and nose and mouth without ulcers or lesions     NECK: no adenopathy, no asymmetry, masses, or scars and thyroid " normal to palpation     RESP: lungs clear to auscultation - no rales, rhonchi or wheezes     CV: regular rates and rhythm, normal S1 S2, no S3 or S4 and no murmur, click or rub     ABDOMEN:  soft, nontender, no HSM or masses and bowel sounds normal     MS: extremities normal- no gross deformities noted, no evidence of inflammation in joints, FROM in all extremities.     SKIN: no suspicious lesions or rashes     NEURO: Normal strength and tone, sensory exam grossly normal, mentation intact and speech normal     PSYCH: mentation appears normal. and affect normal/bright     LYMPHATICS: No cervical adenopathy    Recent Labs   Lab Test 04/19/21  1326 03/31/21  1350   HGB 13.4  --     136   POTASSIUM 4.3 4.7   CR 2.08* 1.79*        Diagnostics:  No labs were ordered during this visit.   No EKG required for low risk surgery (cataract, skin procedure, breast biopsy, etc).    Revised Cardiac Risk Index (RCRI):  The patient has the following serious cardiovascular risks for perioperative complications:   - No serious cardiac risks = 0 points     RCRI Interpretation: 0 points: Class I (very low risk - 0.4% complication rate)           Signed Electronically by: Ivelisse Brantley MD  Copy of this evaluation report is provided to requesting physician.

## 2022-03-17 ENCOUNTER — TELEPHONE (OUTPATIENT)
Dept: NEPHROLOGY | Facility: CLINIC | Age: 82
End: 2022-03-17
Payer: MEDICARE

## 2022-03-17 DIAGNOSIS — R32 URINARY INCONTINENCE: Primary | ICD-10-CM

## 2022-03-17 NOTE — TELEPHONE ENCOUNTER
Dr. Carpio advised that patient be referred to urology for the incontinence. Previously, patient had been dealing with retention. PSA when checked was normal.     Patient was agreeable to referral and will expect a phone call to schedule. Placed referral within 1-2 weeks.    Advised that if symptoms worsen, he should schedule with Dr. Goldstein. Santiago verbalized understanding.    Georgia Valverde RN, BSN  Nephrology Care Coordinator  [facility]

## 2022-03-17 NOTE — TELEPHONE ENCOUNTER
[facility] Call Center    Phone Message    May a detailed message be left on voicemail: yes     Reason for Call: Other: Pt called and stated he is having trouble w/ his incontinence and is wondering if theres anything that the provider can prescribe. Please call pt back to further discuss. Thanks!     Action Taken: Message routed to:  Adult Clinics: Nephrology p [phone]    Travel Screening: Not Applicable

## 2022-03-17 NOTE — TELEPHONE ENCOUNTER
"Spoke to patient regarding his concern. He notes incontinence and frequent urination. He has been wearing a incontinence pad as he seems to have to empty before getting to the toilet. He feels that it can be larger amounts of urine. He denies any pain with urination, foul odor to urine. Urine is yellow and clear. Patient notes to drinking water often. These symptoms have been occurring for 2-3 months. He denies fevers.    Reviewed last office instructions with patient. He is not taking Flomax at this time. It has been, \"quite a few months\" that he has not been taking it. He ran out of the medication an he thought that maybe it was making him a bit more tired. After stopping the med, he is not confident that it was the medication that made him tired.      Advised patient that I would send a note to Dr. Carpio. He is looking forward to the April follow up. He thought that he could try starting a med and follow up on the response at the visit.    Georgia Valverde, RN, BSN  Nephrology Care Coordinator  [facility]    "

## 2022-03-20 ENCOUNTER — MEDICAL CORRESPONDENCE (OUTPATIENT)
Dept: HEALTH INFORMATION MANAGEMENT | Facility: CLINIC | Age: 82
End: 2022-03-20
Payer: MEDICARE

## 2022-05-17 ENCOUNTER — TELEPHONE (OUTPATIENT)
Dept: FAMILY MEDICINE | Facility: CLINIC | Age: 82
End: 2022-05-17
Payer: MEDICARE

## 2022-05-17 ENCOUNTER — MEDICAL CORRESPONDENCE (OUTPATIENT)
Dept: HEALTH INFORMATION MANAGEMENT | Facility: CLINIC | Age: 82
End: 2022-05-17

## 2022-05-17 NOTE — TELEPHONE ENCOUNTER
Iveth RN with [facility] called the clinic to update the provider.  1.  Patient was discharged from the TCU and opened to home care 5/13/22.    Iveth would like the provider to be aware that patient is not taking apixaban ANTICOAGULANT (ELIQUIS) 2.5 MG tablet as prescribed.  Patient reports that he was instructed by the TCU provider's to STOP taking this medication.    2.  Patient is currently taking carbidopa-levodopa (SINEMET)  MG per tablet 25-100mg TID    Copied from the ED encounter dated 3/20/22  He also was found to have significant rigidity stiffness and tremors neurology was consulted and they felt this is typical of Parkinson's disease he was started on Sinemet  And has had reasonable response  Follow-up with neurology planned in the next month or so.    Iveth advised patient and family to call the clinic and schedule the post ER/TCU appointment.

## 2022-05-17 NOTE — TELEPHONE ENCOUNTER
Thank you for the update      Recommended for an appointment to unravel all of this / post transitional care unit discharge follow up office visit seems appropriate     Cali Goldstein MD

## 2022-05-20 ENCOUNTER — MEDICAL CORRESPONDENCE (OUTPATIENT)
Dept: HEALTH INFORMATION MANAGEMENT | Facility: CLINIC | Age: 82
End: 2022-05-20

## 2022-05-25 ENCOUNTER — OFFICE VISIT (OUTPATIENT)
Dept: URGENT CARE | Facility: URGENT CARE | Age: 82
End: 2022-05-25
Payer: MEDICARE

## 2022-05-25 VITALS
HEART RATE: 87 BPM | OXYGEN SATURATION: 98 % | DIASTOLIC BLOOD PRESSURE: 84 MMHG | WEIGHT: 157.9 LBS | TEMPERATURE: 97.4 F | SYSTOLIC BLOOD PRESSURE: 157 MMHG | BODY MASS INDEX: 25.08 KG/M2

## 2022-05-25 DIAGNOSIS — R42 DIZZINESS: Primary | ICD-10-CM

## 2022-05-25 PROCEDURE — 99214 OFFICE O/P EST MOD 30 MIN: CPT | Performed by: PHYSICIAN ASSISTANT

## 2022-05-25 RX ORDER — CARBIDOPA AND LEVODOPA 25; 100 MG/1; MG/1
1 TABLET ORAL 3 TIMES DAILY
COMMUNITY
Start: 2022-05-09 | End: 2022-06-09

## 2022-05-25 ASSESSMENT — ENCOUNTER SYMPTOMS
HEADACHES: 0
DIZZINESS: 1

## 2022-05-25 NOTE — PROGRESS NOTES
Patient presents with:  Dizziness: Started this morning --Gets dizzy when he stand up, got out of bed this morning lost balance due to dizziness, gets better when he stands still started up again when he moves.  Room was spinning when he stood. No blurred vision. Says he got dizzy ionce before due to dehydration. Duration was getting longer throughout the day  Vomiting: Threw up his lunch today       Clinical Decision Making: Patient appears frail and weak.  He is unable to maintain his balance to stand and walk to the exam table.  I am unable to evaluate for orthostatic hypotension.  Description of dizziness does not sound consistent with BPPV.  Recommend bigger work-up where we are able to evaluate for dehydration and vestibular CVA.        ICD-10-CM    1. Dizziness  R42        There are no Patient Instructions on file for this visit.    HPI:  Kilo Joya is a 82 year old male who presents today complaining of dizziness that started this morning.  His dizziness worsened when moving from a sitting to a standing position.  It improves if he stands still.  Reports a dizziness sensation as a spinning sensation.  He denies any blurred vision.    History obtained from the patient.    Problem List:  2022-03: Personal history of DVT (deep vein thrombosis)  2017-12: Anemia  2017-06: Peripheral polyneuropathy  2017-01: [place] cardiac risk 10-20% in next 10 years  2016-03: Elevated coronary artery calcium score  2012-12: Xerosis cutis  2012-12: Actinic keratoses  2012-07: Chronic lymphocytic leukemia (H)  2012-07: CKD (chronic kidney disease) stage 3, GFR 30-59 ml/min (H)  2011-11: Hip joint pain  2011-11: Low back pain  2011-11: Groin pain  2011-11: Advanced directives, counseling/discussion  2011-04: Scrotal pain  2010-12: Hyperlipidemia with target LDL less than 100  2008-10: Pain in joint, shoulder region      Past Medical History:   Diagnosis Date     Ankle fracture, right      CLL (chronic lymphocytic  leukaemia)      Left knee pain     takes glucosamine     Shingles        Social History     Tobacco Use     Smoking status: Never Smoker     Smokeless tobacco: Never Used   Substance Use Topics     Alcohol use: Yes     Comment: rarely       Review of Systems   Eyes: Negative for visual disturbance.   Musculoskeletal: Positive for gait problem.   Neurological: Positive for dizziness. Negative for headaches.       Vitals:    05/25/22 1709   BP: (!) 157/84   Pulse: 87   Temp: 97.4  F (36.3  C)   SpO2: 98%   Weight: 71.6 kg (157 lb 14.4 oz)       Physical Exam  Vitals and nursing note reviewed.   Constitutional:       General: He is not in acute distress.     Appearance: He is not toxic-appearing or diaphoretic.   HENT:      Head: Normocephalic and atraumatic.      Right Ear: External ear normal.      Left Ear: External ear normal.   Eyes:      Conjunctiva/sclera: Conjunctivae normal.   Pulmonary:      Effort: Pulmonary effort is normal. No respiratory distress.   Neurological:      Mental Status: He is alert.      Comments: Patient attempted to stand and suddenly lost his balance when trying to step up on the exam table.  He felt woozy moving from a seated to standing position.   Psychiatric:         Mood and Affect: Mood normal.         Behavior: Behavior normal.         Thought Content: Thought content normal.         Judgment: Judgment normal.         At the end of the encounter, I discussed results, diagnosis, medications. Discussed red flags for immediate return to clinic/ER, as well as indications for follow up if no improvement. Patient understood and agreed to plan. Patient was stable for discharge.

## 2022-06-02 ENCOUNTER — TRANSFERRED RECORDS (OUTPATIENT)
Dept: INTERNAL MEDICINE | Facility: CLINIC | Age: 82
End: 2022-06-02

## 2022-06-07 ENCOUNTER — TELEPHONE (OUTPATIENT)
Dept: INTERNAL MEDICINE | Facility: CLINIC | Age: 82
End: 2022-06-07
Payer: MEDICARE

## 2022-06-07 NOTE — TELEPHONE ENCOUNTER
Reason for Call:  Form, our goal is to have forms completed with 72 hours, however, some forms may require a visit or additional information.    Type of letter, form or note:  Home Health Certification    Who is the form from?: Home care    Where did the form come from: form was faxed in    What clinic location was the form placed at?: [facility]    Where the form was placed: Given to physician    What number is listed as a contact on the form?: [phone]       Call taken on 6/7/2022 at 3:00 PM by Dona Al

## 2022-06-07 NOTE — TELEPHONE ENCOUNTER
Form received and given to Dr. Goldstein to sign when he returns to the office on Thursday, June 9th.    Patient has appointment scheduled for that day.    Dona Al,

## 2022-06-08 PROBLEM — G20.A1 PARKINSON DISEASE (H): Status: ACTIVE | Noted: 2022-06-08

## 2022-06-09 ENCOUNTER — OFFICE VISIT (OUTPATIENT)
Dept: INTERNAL MEDICINE | Facility: CLINIC | Age: 82
End: 2022-06-09
Payer: MEDICARE

## 2022-06-09 VITALS
SYSTOLIC BLOOD PRESSURE: 120 MMHG | OXYGEN SATURATION: 96 % | DIASTOLIC BLOOD PRESSURE: 76 MMHG | BODY MASS INDEX: 30.18 KG/M2 | TEMPERATURE: 98.1 F | WEIGHT: 190 LBS | RESPIRATION RATE: 14 BRPM | HEART RATE: 95 BPM

## 2022-06-09 DIAGNOSIS — D64.9 ANEMIA, UNSPECIFIED TYPE: ICD-10-CM

## 2022-06-09 DIAGNOSIS — N18.32 STAGE 3B CHRONIC KIDNEY DISEASE (H): ICD-10-CM

## 2022-06-09 DIAGNOSIS — M25.511 RIGHT SHOULDER PAIN, UNSPECIFIED CHRONICITY: ICD-10-CM

## 2022-06-09 DIAGNOSIS — G20.A1 PARKINSON DISEASE (H): Primary | ICD-10-CM

## 2022-06-09 DIAGNOSIS — E78.5 HYPERLIPIDEMIA WITH TARGET LDL LESS THAN 100: ICD-10-CM

## 2022-06-09 DIAGNOSIS — H61.23 BILATERAL IMPACTED CERUMEN: ICD-10-CM

## 2022-06-09 DIAGNOSIS — C91.10 CHRONIC LYMPHOCYTIC LEUKEMIA (H): ICD-10-CM

## 2022-06-09 LAB
BASOPHILS # BLD AUTO: 0.1 10E3/UL (ref 0–0.2)
BASOPHILS NFR BLD AUTO: 1 %
EOSINOPHIL # BLD AUTO: 0.7 10E3/UL (ref 0–0.7)
EOSINOPHIL NFR BLD AUTO: 8 %
ERYTHROCYTE [DISTWIDTH] IN BLOOD BY AUTOMATED COUNT: 15 % (ref 10–15)
HCT VFR BLD AUTO: 34.9 % (ref 40–53)
HGB BLD-MCNC: 11.3 G/DL (ref 13.3–17.7)
LYMPHOCYTES # BLD AUTO: 2.5 10E3/UL (ref 0.8–5.3)
LYMPHOCYTES NFR BLD AUTO: 27 %
MCH RBC QN AUTO: 31.1 PG (ref 26.5–33)
MCHC RBC AUTO-ENTMCNC: 32.4 G/DL (ref 31.5–36.5)
MCV RBC AUTO: 96 FL (ref 78–100)
MONOCYTES # BLD AUTO: 1.3 10E3/UL (ref 0–1.3)
MONOCYTES NFR BLD AUTO: 14 %
NEUTROPHILS # BLD AUTO: 4.6 10E3/UL (ref 1.6–8.3)
NEUTROPHILS NFR BLD AUTO: 50 %
PLATELET # BLD AUTO: 269 10E3/UL (ref 150–450)
RBC # BLD AUTO: 3.63 10E6/UL (ref 4.4–5.9)
WBC # BLD AUTO: 9.1 10E3/UL (ref 4–11)

## 2022-06-09 PROCEDURE — 99214 OFFICE O/P EST MOD 30 MIN: CPT | Performed by: INTERNAL MEDICINE

## 2022-06-09 PROCEDURE — 80061 LIPID PANEL: CPT | Performed by: INTERNAL MEDICINE

## 2022-06-09 PROCEDURE — 82043 UR ALBUMIN QUANTITATIVE: CPT | Performed by: INTERNAL MEDICINE

## 2022-06-09 PROCEDURE — 85025 COMPLETE CBC W/AUTO DIFF WBC: CPT | Performed by: INTERNAL MEDICINE

## 2022-06-09 PROCEDURE — 80048 BASIC METABOLIC PNL TOTAL CA: CPT | Performed by: INTERNAL MEDICINE

## 2022-06-09 PROCEDURE — 36415 COLL VENOUS BLD VENIPUNCTURE: CPT | Performed by: INTERNAL MEDICINE

## 2022-06-09 RX ORDER — CARBIDOPA AND LEVODOPA 25; 100 MG/1; MG/1
1 TABLET ORAL 3 TIMES DAILY
Qty: 90 TABLET | Refills: 0 | Status: SHIPPED | OUTPATIENT
Start: 2022-06-09

## 2022-06-09 NOTE — TELEPHONE ENCOUNTER
Patient no showed visit today with Dr. Goldstein.    LM for Sunni Villalta, from [facility]  (ph: [phone]) that he no showed and that Dr. Goldstein is unable to sign any home care orders until patient is seen.    Last visit with Dr. Goldstein was 3-.    Dona Al,

## 2022-06-09 NOTE — PROGRESS NOTES
Assessment & Plan     Parkinson disease (H)  Today is an office visit to establish care around some new issues, I have seen Santiago many times before but not for 15 months and not since a lot has happened. He now has a new diagnosis of parkinsons disease and has a pending upcoming neurological consultation. He's asking me to provide some carbidopa-levodopa (SINEMET) to hold him over which I have no troubles doing although I did need to explain that treatment of parkinsons disease is something one needs to be a neurologist to be comfortable  With so this is just a prescription provided to hold him over. We spent some good bit of time reviewing everything and all questions answered   - CBC with platelets and differential; Future  - carbidopa-levodopa (SINEMET)  MG tablet; Take 1 tablet by mouth 3 times daily  - CBC with platelets and differential    Hyperlipidemia with target LDL less than 100  Due for recheck, follow up as indicated on results   - Lipid panel reflex to direct LDL Non-fasting; Future  - Lipid panel reflex to direct LDL Non-fasting    Anemia, unspecified type  Due for recheck, follow up as indicated on results   - CBC with platelets and differential; Future  - CBC with platelets and differential    Stage 3b chronic kidney disease (H)  Diagnosis explained, keep an eye on things   - Basic metabolic panel; Future  - Albumin Random Urine Quantitative with Creat Ratio; Future  - Basic metabolic panel  - Albumin Random Urine Quantitative with Creat Ratio    Chronic lymphocytic leukemia (H)  He sees Dr. Rodríguez Villarreal with [facility] and this is a stable phase of chronic illness   - CBC with platelets and differential; Future  - CBC with platelets and differential    Bilateral impacted cerumen  Successfully irrigated out by my medical assistant      Right shoulder pain, unspecified chronicity  A chronic issue      Review of the result(s) of each unique test - see todays tests  Prescription drug  management  22 minutes spent on the date of the encounter doing chart review, history and exam, documentation and further activities per the note    Return in about 6 months (around 12/9/2022).    Cali Goldstein MD  [facility] [place] is a 82 year old who presents for the following health issues   Encounter Diagnoses   Name Primary?     Parkinson disease (H) Yes     Hyperlipidemia with target LDL less than 100      Anemia, unspecified type      Stage 3b chronic kidney disease (H)      Chronic lymphocytic leukemia (H)      Bilateral impacted cerumen      Right shoulder pain, unspecified chronicity        HPI   Chief Complaint   Patient presents with     Recheck Medication     Forms     Homecare       Last appointment with me was 3-12-21  Health Maintenance Due   Topic Date Due     ZOSTER IMMUNIZATION (1 of 2) Never done     COVID-19 Vaccine (4 - Booster for Pfizer series) 03/09/2022     MEDICARE ANNUAL WELLNESS VISIT  03/12/2022     LIPID  03/12/2022     BMP  04/19/2022     MICROALBUMIN  04/19/2022     HEMOGLOBIN  04/19/2022      Said to be a medication check but I am prescribing nothing , until new issues reviewed today  ! He had a Coronavirus (COVID-19) infection recently [ march ]  and he was very very sick and eventually went to medical [facility] transitional care unit for 3 weeks, now has been home for about a month. While inpatient at transitional care unit he was found with parkinsons disease and he's had a 90 day supply of carbidopa-levodopa (SINEMET) and he is asking me now to address this, he takes this 3 times a day.    5-25-22 emergency room evaluation for sinus infection and vomiting and vertigo  Just before emergency room, seen with urgent care clinic for dizziness symptoms same day 5-25-22 5-9-22 office visit with Scarlett Lucia physicians assistant for follow up of parkinsons disease , seen at [facility] , this was a new diagnosis with [facility]  [facility]     Kilo was admitted to the hospital on March 20, 2022 after testing positive for COVID 2 days prior to admission and experiencing increased fatigue, weakness and diarrhea. He is immunosuppressed due to history of CLL. Given his immunosuppressed status it was recommended isolation for total of 20 days. During his hospital evaluation and work-up showed an elevated CK which was felt to be secondary to muscle rigidity and stiffness later identified as likely Parkinson's disease by inpatient neurology. He was started on carbidopa levodopa  mg with improvement of mobility. MRI of the brain showed only mild age-related changes only and was negative for evidence of infarct contributing to parkinsonism presentation. CK was trending downward prior to discharge. Given past history of DVT and CLL he was recommended to be on Eliquis 2.5 mg twice daily for DVT prophylaxis for 30 days.    He was ultimately discharged to [facility] to continue undergoing physical therapy. He initially needed help with feeding however this improved prior to discharge. He states he undergoes PT twice per day.    When discussing his health prior to COVID diagnosis he was living independently in a senior apartment. He could get breakfast Monday through Friday in a common area and lunch is Monday Wednesday and Friday if needed. Patient was driving and sons would occasionally help with shopping if needed however could do grocery shopping on his own. Family denies any obvious history of resting tremor. Patient does endorse a slow shuffling gait prior to his hospital stay and occasional truncal instability. He does endorse prior difficulty with swallowing. His son Flaquito states that his swallowing has seemed much more comfortable since being discharged from the hospital on Sinemet. There is also noted micrographia and hypophonia.    Kilo also has a history of a right upper extremity injury. He states he had an injury while  carrying a heavy jug. He describes soreness of the right upper extremity extending from his shoulder to his elbow. He has had reduced function in the upper extremity. Prior evaluation with orthopedics showed only mild arthritis but no obvious abnormality. He denies significant numbness or paresthesia. He has mostly transferred upper extremity tasks to his left hand.    Still sees Dr. Rodríguez Villarreal with [facility] for his chronic lymphocytic leukemia, last appointment July 2021, see [facility] electronic medical records [ we need to request the notes from 1-2 weeks ago a [facility] group, he had recently an infusion of Arimidex (anastrozole) and he's going the have some additional injections within the next few weeks,]    Sees Anjana Carpio MD, nephrologist at [facility] for follow up of chronic kidney disease 3B [ actually this appointment was supposed to happen but needs to be rescheduled ]          Review of Systems   Constitutional, HEENT, cardiovascular, pulmonary, gi and gu systems are negative, except as otherwise noted.      Objective    /76   Pulse 95   Temp 98.1  F (36.7  C) (Tympanic)   Resp 14   Wt 86.2 kg (190 lb)   SpO2 96%   BMI 30.18 kg/m    Body mass index is 30.18 kg/m .  Physical Exam   GENERAL: healthy, alert and no distress  EYES: Eyes grossly normal to inspection, PERRL and conjunctivae and sclerae normal  RESP: lungs clear to auscultation - no rales, rhonchi or wheezes  CV: regular rate and rhythm, normal S1 S2, no S3 or S4, no murmur, click or rub, no peripheral edema and peripheral pulses strong  MS: no gross musculoskeletal defects noted, no edema    Orders Placed This Encounter   Procedures     Lipid panel reflex to direct LDL Non-fasting     Basic metabolic panel     Albumin Random Urine Quantitative with Creat Ratio     CBC with platelets and differential     CBC with platelets and differential

## 2022-06-09 NOTE — PROGRESS NOTES
Last appointment with me was 3-12-21  Health Maintenance Due   Topic Date Due     ZOSTER IMMUNIZATION (1 of 2) Never done     COVID-19 Vaccine (4 - Booster for Pfizer series) 03/09/2022     MEDICARE ANNUAL WELLNESS VISIT  03/12/2022     LIPID  03/12/2022     BMP  04/19/2022     MICROALBUMIN  04/19/2022     HEMOGLOBIN  04/19/2022      Said to be a medication check but I am prescribing nothing ! He had a Coronavirus (COVID-19) infection recently and he was very very sick and eventually went to medical assistant transitional care unit for 3 weeks, now has been home for about a month. While inpatient at transitional care unit he was found with parkinsons disease and he's had a 90 day supply of carbidopa-levodopa (SINEMET) and he is asking me now to address this, he takes this 3 times a day.    5-25-22 emergency room evaluation for sinus infection and vomiting and vertigo  Just before emergency room, seen with urgent care clinic for dizziness symptoms same day 5-25-22 5-9-22 office visit with Scarlett Lucia physicians assistant for follow up of parkinsons disease , seen at [facility] , this was a new diagnosis with [facility]     Kilo was admitted to the hospital on March 20, 2022 after testing positive for COVID 2 days prior to admission and experiencing increased fatigue, weakness and diarrhea. He is immunosuppressed due to history of CLL. Given his immunosuppressed status it was recommended isolation for total of 20 days. During his hospital evaluation and work-up showed an elevated CK which was felt to be secondary to muscle rigidity and stiffness later identified as likely Parkinson's disease by inpatient neurology. He was started on carbidopa levodopa  mg with improvement of mobility. MRI of the brain showed only mild age-related changes only and was negative for evidence of infarct contributing to parkinsonism presentation. CK was trending downward prior to discharge. Given past  history of DVT and CLL he was recommended to be on Eliquis 2.5 mg twice daily for DVT prophylaxis for 30 days.    He was ultimately discharged to [facility] to continue undergoing physical therapy. He initially needed help with feeding however this improved prior to discharge. He states he undergoes PT twice per day.    When discussing his health prior to COVID diagnosis he is was living independently in a senior apartment. He could get breakfast Monday through Friday in a common area and lunch is Monday Wednesday and Friday if needed. Patient was driving and sons would occasionally help with shopping if needed however could do grocery shopping on his own. Family denies any obvious history of resting tremor. Patient does endorse a slow shuffling gait prior to his hospital stay and occasional truncal instability. He does endorse prior difficulty with swallowing. His son Flaquito states that his swallowing has seemed much more comfortable since being discharged from the hospital on Sinemet. There is also noted micrographia and hypophonia.    Kilo also has a history of a right upper extremity injury. He states he had an injury while carrying a heavy jug. He describes soreness of the right upper extremity extending from his shoulder to his elbow. He has had reduced function in the upper extremity. Prior evaluation with orthopedics showed only mild arthritis but no obvious abnormality. He denies significant numbness or paresthesia. He has mostly transferred upper extremity tasks to his left hand.    Still sees Dr. Rodríguez Villarreal with [facility] for his chronic lymphocytic leukemia, last appointment July 2021, see [facility] electronic medical records [ we need to request the notes from 1-2 weeks ago a [facility] group, he had recently an infusion of Arimidex (anastrozole) and he's going the have some additional injections within the next few weeks,]    Sees Anjana Carpio MD, nephrologist at [place]  [facility] for follow up of chronic kidney disease 3B was supposed to happen but needs to be rescheduled

## 2022-06-09 NOTE — LETTER
June 13, 2022      Santiago Joya  [address]    [facility] [address]        Dear ,    We are writing to inform you of your test results.    All of these tests are within acceptable limits , things look good !          Resulted Orders   Lipid panel reflex to direct LDL Non-fasting   Result Value Ref Range    Cholesterol 223 (H) <200 mg/dL    Triglycerides 42 <150 mg/dL    Direct Measure HDL 81 >=40 mg/dL    LDL Cholesterol Calculated 134 (H) <=100 mg/dL    Non HDL Cholesterol 142 (H) <130 mg/dL    Patient Fasting > 8hrs? Unknown     Narrative    Cholesterol  Desirable:  <200 mg/dL    Triglycerides  Normal:  Less than 150 mg/dL  Borderline High:  150-199 mg/dL  High:  200-499 mg/dL  Very High:  Greater than or equal to 500 mg/dL    Direct Measure HDL  Female:  Greater than or equal to 50 mg/dL   Male:  Greater than or equal to 40 mg/dL    LDL Cholesterol  Desirable:  <100mg/dL  Above Desirable:  100-129 mg/dL   Borderline High:  130-159 mg/dL   High:  160-189 mg/dL   Very High:  >= 190 mg/dL    Non HDL Cholesterol  Desirable:  130 mg/dL  Above Desirable:  130-159 mg/dL  Borderline High:  160-189 mg/dL  High:  190-219 mg/dL  Very High:  Greater than or equal to 220 mg/dL   Basic metabolic panel   Result Value Ref Range    Sodium 138 133 - 144 mmol/L    Potassium 4.2 3.4 - 5.3 mmol/L    Chloride 104 94 - 109 mmol/L    Carbon Dioxide (CO2) 24 20 - 32 mmol/L    Anion Gap 10 3 - 14 mmol/L    Urea Nitrogen 34 (H) 7 - 30 mg/dL    Creatinine 1.57 (H) 0.66 - 1.25 mg/dL    Calcium 9.2 8.5 - 10.1 mg/dL    Glucose 94 70 - 99 mg/dL    GFR Estimate 44 (L) >60 mL/min/1.73m2      Comment:      Effective December 21, 2021 eGFRcr in adults is calculated using the 2021 CKD-EPI creatinine equation which includes age and gender (Amina mills al., NEJM, DOI: 10.1056/GIDRdg7649936)   Albumin Random Urine Quantitative with Creat Ratio   Result Value Ref Range    Creatinine Urine mg/dL 160 mg/dL    Albumin Urine mg/L 20  mg/L    Albumin Urine mg/g Cr 12.50 0.00 - 17.00 mg/g Cr   CBC with platelets and differential   Result Value Ref Range    WBC Count 9.1 4.0 - 11.0 10e3/uL    RBC Count 3.63 (L) 4.40 - 5.90 10e6/uL    Hemoglobin 11.3 (L) 13.3 - 17.7 g/dL    Hematocrit 34.9 (L) 40.0 - 53.0 %    MCV 96 78 - 100 fL    MCH 31.1 26.5 - 33.0 pg    MCHC 32.4 31.5 - 36.5 g/dL    RDW 15.0 10.0 - 15.0 %    Platelet Count 269 150 - 450 10e3/uL    % Neutrophils 50 %    % Lymphocytes 27 %    % Monocytes 14 %    % Eosinophils 8 %    % Basophils 1 %    Absolute Neutrophils 4.6 1.6 - 8.3 10e3/uL    Absolute Lymphocytes 2.5 0.8 - 5.3 10e3/uL    Absolute Monocytes 1.3 0.0 - 1.3 10e3/uL    Absolute Eosinophils 0.7 0.0 - 0.7 10e3/uL    Absolute Basophils 0.1 0.0 - 0.2 10e3/uL       If you have any questions or concerns, please call the clinic at the number listed above.       Sincerely,      Cali Goldstein MD/dave

## 2022-06-10 LAB
ANION GAP SERPL CALCULATED.3IONS-SCNC: 10 MMOL/L (ref 3–14)
BUN SERPL-MCNC: 34 MG/DL (ref 7–30)
CALCIUM SERPL-MCNC: 9.2 MG/DL (ref 8.5–10.1)
CHLORIDE BLD-SCNC: 104 MMOL/L (ref 94–109)
CHOLEST SERPL-MCNC: 223 MG/DL
CO2 SERPL-SCNC: 24 MMOL/L (ref 20–32)
CREAT SERPL-MCNC: 1.57 MG/DL (ref 0.66–1.25)
CREAT UR-MCNC: 160 MG/DL
FASTING STATUS PATIENT QL REPORTED: ABNORMAL
GFR SERPL CREATININE-BSD FRML MDRD: 44 ML/MIN/1.73M2
GLUCOSE BLD-MCNC: 94 MG/DL (ref 70–99)
HDLC SERPL-MCNC: 81 MG/DL
LDLC SERPL CALC-MCNC: 134 MG/DL
MICROALBUMIN UR-MCNC: 20 MG/L
MICROALBUMIN/CREAT UR: 12.5 MG/G CR (ref 0–17)
NONHDLC SERPL-MCNC: 142 MG/DL
POTASSIUM BLD-SCNC: 4.2 MMOL/L (ref 3.4–5.3)
SODIUM SERPL-SCNC: 138 MMOL/L (ref 133–144)
TRIGL SERPL-MCNC: 42 MG/DL

## 2022-06-13 ENCOUNTER — TELEPHONE (OUTPATIENT)
Dept: FAMILY MEDICINE | Facility: CLINIC | Age: 82
End: 2022-06-13
Payer: MEDICARE

## 2022-06-13 NOTE — TELEPHONE ENCOUNTER
Reason for Call:  Form, our goal is to have forms completed with 72 hours, however, some forms may require a visit or additional information.    Type of letter, form or note:  Home Health Certification    Who is the form from?: Home care    Where did the form come from: form was faxed in    What clinic location was the form placed at?: [facility]    Where the form was placed: [facility]Folder    What number is listed as a contact on the form?: [phone]       Additional comments:     Call taken on 6/13/2022 at 12:39 PM by Vee Ornelas

## 2022-06-13 NOTE — TELEPHONE ENCOUNTER
To the best of my knowledge this was already completed. Please evaluate and if form wasn't completed we may need a repeat form ? Otherwise you can close this encounter      Cali Goldstein MD

## 2022-06-14 NOTE — TELEPHONE ENCOUNTER
C given to Dr. Goldstein to sign when he returns to the office on Thursday, June 16th.    Dona Al,

## 2022-06-24 NOTE — TELEPHONE ENCOUNTER
Form received (these are not the recertification forms, they are for a missed visit order for OT and a change of order for OT frequency).  These were received, signed by Dr. Goldstein and faxed back to [phone].    Dona Al,

## 2022-06-24 NOTE — TELEPHONE ENCOUNTER
Brenda called from [facility] [phone] they sent papers to sign they only received 1 back and it was unsigned please resend all and signed.  Catie Olson,

## 2022-06-24 NOTE — TELEPHONE ENCOUNTER
Spoke to Brenda (ph: [phone]).  Form has been sent to be scanned but is not in chart yet.    She will refax forms to [phone], and we will have Dr. Goldstein sign and fax back.    Dona Al,

## 2022-08-09 ENCOUNTER — OFFICE VISIT (OUTPATIENT)
Dept: OTOLARYNGOLOGY | Facility: CLINIC | Age: 82
End: 2022-08-09
Payer: MEDICARE

## 2022-08-09 ENCOUNTER — OFFICE VISIT (OUTPATIENT)
Dept: AUDIOLOGY | Facility: CLINIC | Age: 82
End: 2022-08-09
Payer: MEDICARE

## 2022-08-09 VITALS — DIASTOLIC BLOOD PRESSURE: 84 MMHG | SYSTOLIC BLOOD PRESSURE: 135 MMHG | HEART RATE: 73 BPM

## 2022-08-09 DIAGNOSIS — H90.3 SENSORINEURAL HEARING LOSS (SNHL) OF BOTH EARS: Primary | ICD-10-CM

## 2022-08-09 DIAGNOSIS — J30.0 VASOMOTOR RHINITIS: ICD-10-CM

## 2022-08-09 DIAGNOSIS — J30.0 VASOMOTOR RHINITIS: Primary | ICD-10-CM

## 2022-08-09 PROCEDURE — 92557 COMPREHENSIVE HEARING TEST: CPT | Performed by: AUDIOLOGIST

## 2022-08-09 PROCEDURE — 92567 TYMPANOMETRY: CPT | Performed by: AUDIOLOGIST

## 2022-08-09 PROCEDURE — 99203 OFFICE O/P NEW LOW 30 MIN: CPT | Performed by: OTOLARYNGOLOGY

## 2022-08-09 ASSESSMENT — ENCOUNTER SYMPTOMS
STRIDOR: 0
TREMORS: 0
CONSTITUTIONAL NEGATIVE: 1
TINGLING: 0
SORE THROAT: 0
VOMITING: 0
HEMOPTYSIS: 0
HEARTBURN: 0
HEADACHES: 0
DIZZINESS: 0
PHOTOPHOBIA: 0
BRUISES/BLEEDS EASILY: 0
SINUS PAIN: 0
DOUBLE VISION: 0
BLURRED VISION: 0
NAUSEA: 0
SPUTUM PRODUCTION: 0
COUGH: 0

## 2022-08-09 ASSESSMENT — PAIN SCALES - GENERAL: PAINLEVEL: NO PAIN (0)

## 2022-08-09 NOTE — NURSING NOTE
Kilo Joya's chief complaint for this visit includes:  Chief Complaint   Patient presents with     Consult     Hearing loss     PCP: Cali Goldstein    Referring Provider:  Referred Self, MD  No address on file    There were no vitals taken for this visit.  Data Unavailable        Allergies   Allergen Reactions     Sulfa Drugs Hives and Swelling     Warfarin Rash     Xarelto [Rivaroxaban] Rash         Do you need any medication refills at today's visit?

## 2022-08-09 NOTE — NURSING NOTE
Kilo Joya's goals for this visit include:   Chief Complaint   Patient presents with     Hearing Problem     Decrease in hearing since sinus infection about 3 months ago.  Feels like fluid in ear.  Referred to ENT by [facility]       He requests these members of his care team be copied on today's visit information:     PCP: Cali Goldstein    Referring Provider:  Tee Mike MD  [address]  [place] [address]    There were no vitals taken for this visit.    Do you need any medication refills at today's visit? No    Monserrat Moreira RN

## 2022-08-09 NOTE — PROGRESS NOTES
AUDIOLOGY REPORT    SUMMARY: Audiology visit completed. See audiogram for results.    RECOMMENDATIONS: Follow-up with ENT.    Elton Wang  Doctor of Audiology  [place] License # [id number]

## 2022-08-09 NOTE — PROGRESS NOTES
HPI    This pleasant patient is here with his son. He has been having aural pressure and plugged up sensation after a severe sinus infection 3 months ago. He feels much better in terms of facial pressure, runny nose and nasal congestion but states that he sleeps his mouth open with dry mouth and pharynx. His speech is not normal. Denies any difficulty swallowing, fever, runny nose, sneezing or choking.  He has a hx of dizzy spells that occurred again 3 months ago and lasted several weeks, spinning, nausea, vomiting, and aural and facial pressure. His primary doctor ruled out any stroke with an MRI and found out that he has bilateral acute maxillary sinusitis.    ENT Problem List  Parkinson disease (H)    Hyperlipidemia with target LDL less than 100    Scrotal pain    Advanced directives, counseling/discussion    CKD (chronic kidney disease) stage 3, GFR 30-59 ml/min (H)    Chronic lymphocytic leukemia (H)    Xerosis cutis    Actinic keratoses    Elevated coronary artery calcium score    [place] cardiac risk 10-20% in next 10 years    Peripheral polyneuropathy    Anemia    Personal history of DVT (deep vein thrombosis)      Current medications:    carbamide peroxide (DEBROX) 6.5 % otic solution    carbidopa-levodopa (SINEMET)  MG tablet    Multiple Vitamins-Minerals (MULTIVITAMIN OR)    polyethylene glycol-propylene glycol (SYSTANE ULTRA) 0.4-0.3 % SOLN ophthalmic solution    riTUXimab 100 MG/10ML SOLN     Hx: Gradual hearing decline bilaterally. Pt also reports longstanding tinnitus bilaterally. Previous results from 7/21/15 revealed normal to  moderate SNHL bilaterally.  Results: Mild to moderate SNHL bilaterally. 100% word rec. bilaterally. Tymps WNL.    Review of Systems   Constitutional: Negative.    HENT: Positive for congestion, hearing loss and tinnitus. Negative for ear discharge, ear pain, nosebleeds, sinus pain and sore throat.    Eyes: Negative for blurred vision, double vision and photophobia.    Respiratory: Negative for cough, hemoptysis, sputum production and stridor.    Gastrointestinal: Negative for heartburn, nausea and vomiting.   Skin: Negative.    Neurological: Negative for dizziness, tingling, tremors and headaches.   Endo/Heme/Allergies: Negative for environmental allergies. Does not bruise/bleed easily.         Physical Exam  Vitals reviewed.   Constitutional:       Appearance: Normal appearance.   HENT:      Head: Normocephalic and atraumatic.      Right Ear: Tympanic membrane and ear canal normal. Decreased hearing noted. No middle ear effusion. There is impacted cerumen.      Left Ear: Tympanic membrane, ear canal and external ear normal. Decreased hearing noted.  No middle ear effusion. There is no impacted cerumen.      Nose: Congestion present. No rhinorrhea.      Right Turbinates: Swollen.      Left Turbinates: Swollen.      Mouth/Throat:      Mouth: Mucous membranes are moist.      Pharynx: Oropharynx is clear. Uvula midline.   Eyes:      Extraocular Movements: Extraocular movements intact.      Pupils: Pupils are equal, round, and reactive to light.   Neurological:      Mental Status: He is alert.       No spontaneous nystagmus    A/P  This pleasant patient is having Gradual hearing decline bilaterally. Pt also reports longstanding tinnitus bilaterally.  He has a mild to moderate SNHL bilaterally. 100% word rec. bilaterally. Tymps WNL.

## 2022-08-09 NOTE — LETTER
8/9/2022         RE: Kilo Joya  [address]  [address]  [place] [address]        Dear Colleague,    Thank you for referring your patient, Kilo Joya, to the [facility]. Please see a copy of my visit note below.    HPI    This pleasant patient is here with his son. He has been having aural pressure and plugged up sensation after a severe sinus infection 3 months ago. He feels much better in terms of facial pressure, runny nose and nasal congestion but states that he sleeps his mouth open with dry mouth and pharynx. His speech is not normal. Denies any difficulty swallowing, fever, runny nose, sneezing or choking.  He has a hx of dizzy spells that occurred again 3 months ago and lasted several weeks, spinning, nausea, vomiting, and aural and facial pressure. His primary doctor ruled out any stroke with an MRI and found out that he has bilateral acute maxillary sinusitis.    ENT Problem List  Parkinson disease (H)    Hyperlipidemia with target LDL less than 100    Scrotal pain    Advanced directives, counseling/discussion    CKD (chronic kidney disease) stage 3, GFR 30-59 ml/min (H)    Chronic lymphocytic leukemia (H)    Xerosis cutis    Actinic keratoses    Elevated coronary artery calcium score    [place] cardiac risk 10-20% in next 10 years    Peripheral polyneuropathy    Anemia    Personal history of DVT (deep vein thrombosis)      Current medications:    carbamide peroxide (DEBROX) 6.5 % otic solution    carbidopa-levodopa (SINEMET)  MG tablet    Multiple Vitamins-Minerals (MULTIVITAMIN OR)    polyethylene glycol-propylene glycol (SYSTANE ULTRA) 0.4-0.3 % SOLN ophthalmic solution    riTUXimab 100 MG/10ML SOLN     Hx: Gradual hearing decline bilaterally. Pt also reports longstanding tinnitus bilaterally. Previous results from 7/21/15 revealed normal to  moderate SNHL bilaterally.  Results: Mild to moderate SNHL bilaterally. 100% word rec. bilaterally. Tymps  WNL.    Review of Systems   Constitutional: Negative.    HENT: Positive for congestion, hearing loss and tinnitus. Negative for ear discharge, ear pain, nosebleeds, sinus pain and sore throat.    Eyes: Negative for blurred vision, double vision and photophobia.   Respiratory: Negative for cough, hemoptysis, sputum production and stridor.    Gastrointestinal: Negative for heartburn, nausea and vomiting.   Skin: Negative.    Neurological: Negative for dizziness, tingling, tremors and headaches.   Endo/Heme/Allergies: Negative for environmental allergies. Does not bruise/bleed easily.         Physical Exam  Vitals reviewed.   Constitutional:       Appearance: Normal appearance.   HENT:      Head: Normocephalic and atraumatic.      Right Ear: Tympanic membrane and ear canal normal. Decreased hearing noted. No middle ear effusion. There is impacted cerumen.      Left Ear: Tympanic membrane, ear canal and external ear normal. Decreased hearing noted.  No middle ear effusion. There is no impacted cerumen.      Nose: Congestion present. No rhinorrhea.      Right Turbinates: Swollen.      Left Turbinates: Swollen.      Mouth/Throat:      Mouth: Mucous membranes are moist.      Pharynx: Oropharynx is clear. Uvula midline.   Eyes:      Extraocular Movements: Extraocular movements intact.      Pupils: Pupils are equal, round, and reactive to light.   Neurological:      Mental Status: He is alert.       No spontaneous nystagmus    A/P  This pleasant patient is having Gradual hearing decline bilaterally. Pt also reports longstanding tinnitus bilaterally.  He has a mild to moderate SNHL bilaterally. 100% word rec. bilaterally. Tymps WNL.        Again, thank you for allowing me to participate in the care of your patient.        Sincerely,        Tee Mike MD

## 2022-08-25 ENCOUNTER — TRANSFERRED RECORDS (OUTPATIENT)
Dept: HEALTH INFORMATION MANAGEMENT | Facility: CLINIC | Age: 82
End: 2022-08-25

## 2022-09-13 ENCOUNTER — OFFICE VISIT (OUTPATIENT)
Dept: AUDIOLOGY | Facility: CLINIC | Age: 82
End: 2022-09-13
Payer: MEDICARE

## 2022-09-13 DIAGNOSIS — H90.3 SENSORINEURAL HEARING LOSS (SNHL) OF BOTH EARS: Primary | ICD-10-CM

## 2022-09-13 PROCEDURE — V5010 ASSESSMENT FOR HEARING AID: HCPCS | Performed by: AUDIOLOGIST

## 2022-09-13 NOTE — PROGRESS NOTES
AUDIOLOGY REPORT    SUBJECTIVE: Kilo Joya is a 82 year old male was seen in the Audiology Clinic at  [facility] on 9/13/22 to discuss concerns with hearing and functional communication difficulties. The patient was accompanied by their son. Kilo has been seen previously on 8/09/22, and results revealed a mild to moderate sensorineural hearing loss bilaterally.  The patient was medically evaluated and determined to be cleared for binaural hearing aids by Bishnu Mike MD. Kilo notes difficulty with communication in a variety of listening situations.    OBJECTIVE:    Patient is a hearing aid candidate. Options were very briefly discussed. The patient reported that [facility] before proceeding.     ASSESSMENT:     ICD-10-CM    1. Sensorineural hearing loss (SNHL) of both ears  H90.3 No Charge, Hearing Aid Clinic Visit       PLAN: Kilo will contact us with any questions he has or if he would like to schedule a consult. Please contact this clinic with any questions or concerns.      Luke Wang.  Doctor of Audiology  [place] License # [id number]

## 2022-10-20 ENCOUNTER — TRANSFERRED RECORDS (OUTPATIENT)
Dept: HEALTH INFORMATION MANAGEMENT | Facility: CLINIC | Age: 82
End: 2022-10-20

## 2022-12-15 ENCOUNTER — TRANSFERRED RECORDS (OUTPATIENT)
Dept: HEALTH INFORMATION MANAGEMENT | Facility: CLINIC | Age: 82
End: 2022-12-15

## 2023-02-06 ENCOUNTER — OFFICE VISIT (OUTPATIENT)
Dept: FAMILY MEDICINE | Facility: CLINIC | Age: 83
End: 2023-02-06
Payer: MEDICARE

## 2023-02-06 VITALS
WEIGHT: 156 LBS | OXYGEN SATURATION: 97 % | HEIGHT: 67 IN | TEMPERATURE: 98.4 F | SYSTOLIC BLOOD PRESSURE: 134 MMHG | HEART RATE: 89 BPM | BODY MASS INDEX: 24.48 KG/M2 | DIASTOLIC BLOOD PRESSURE: 68 MMHG

## 2023-02-06 DIAGNOSIS — G20.A1 PARKINSON DISEASE (H): ICD-10-CM

## 2023-02-06 DIAGNOSIS — N18.32 STAGE 3B CHRONIC KIDNEY DISEASE (H): ICD-10-CM

## 2023-02-06 DIAGNOSIS — Z86.718 PERSONAL HISTORY OF DVT (DEEP VEIN THROMBOSIS): ICD-10-CM

## 2023-02-06 DIAGNOSIS — H61.23 BILATERAL IMPACTED CERUMEN: ICD-10-CM

## 2023-02-06 DIAGNOSIS — Z01.818 PREOP GENERAL PHYSICAL EXAM: Primary | ICD-10-CM

## 2023-02-06 DIAGNOSIS — C91.10 CHRONIC LYMPHOCYTIC LEUKEMIA (H): ICD-10-CM

## 2023-02-06 DIAGNOSIS — H26.9 CATARACT, UNSPECIFIED CATARACT TYPE, UNSPECIFIED LATERALITY: ICD-10-CM

## 2023-02-06 DIAGNOSIS — Z23 HIGH PRIORITY FOR 2019-NCOV VACCINE: ICD-10-CM

## 2023-02-06 PROCEDURE — 91312 COVID-19 VACCINE BIVALENT BOOSTER 12+ (PFIZER): CPT | Performed by: PHYSICIAN ASSISTANT

## 2023-02-06 PROCEDURE — 99214 OFFICE O/P EST MOD 30 MIN: CPT | Mod: 25 | Performed by: PHYSICIAN ASSISTANT

## 2023-02-06 PROCEDURE — 69209 REMOVE IMPACTED EAR WAX UNI: CPT | Performed by: PHYSICIAN ASSISTANT

## 2023-02-06 PROCEDURE — 0124A COVID-19 VACCINE BIVALENT BOOSTER 12+ (PFIZER): CPT | Performed by: PHYSICIAN ASSISTANT

## 2023-02-06 NOTE — PATIENT INSTRUCTIONS
Take your Sinemet the morning of surgery.       Patient Education    For informational purposes only. Not to replace the advice of your health care provider. Copyright   ,  [facility]. All rights reserved. Clinically reviewed by Dorothea Foy MD. [employer] [id number] - REV .  Preparing for Your Surgery  Getting started  A nurse will call you to review your health history and instructions. They will give you an arrival time based on your scheduled surgery time. Please be ready to share:  Your doctor's clinic name and phone number  Your medical, surgical, and anesthesia history  A list of allergies and sensitivities  A list of medicines, including herbal treatments and over-the-counter drugs  Whether the patient has a legal guardian (ask how to send us the papers in advance)  Please tell us if you're pregnant--or if there's any chance you might be pregnant. Some surgeries may injure a fetus (unborn baby), so they require a pregnancy test. Surgeries that are safe for a fetus don't always need a test, and you can choose whether to have one.   If you have a child who's having surgery, please ask for a copy of Preparing for Your Child's Surgery.    Preparing for surgery  Within 10 to 30 days of surgery: Have a pre-op exam (sometimes called an H&P, or History and Physical). This can be done at a clinic or pre-operative center.  If you're having a , you may not need this exam. Talk to your care team.  At your pre-op exam, talk to your care team about all medicines you take. If you need to stop any medicines before surgery, ask when to start taking them again.  We do this for your safety. Many medicines can make you bleed too much during surgery. Some change how well surgery (anesthesia) drugs work.  Call your insurance company to let them know you're having surgery. (If you don't have insurance, call [phone].)  Call your clinic if there's any change in your health. This includes signs of  a cold or flu (sore throat, runny nose, cough, rash, fever). It also includes a scrape or scratch near the surgery site.  If you have questions on the day of surgery, call your hospital or surgery center.  Eating and drinking guidelines  For your safety: Unless your surgeon tells you otherwise, follow the guidelines below.  Eat and drink as usual until 8 hours before you arrive for surgery. After that, no food or milk.  Drink clear liquids until 2 hours before you arrive. These are liquids you can see through, like water, Gatorade, and Propel Water. They also include plain black coffee and tea (no cream or milk), candy, and breath mints. You can spit out gum when you arrive.  If you drink alcohol: Stop drinking it the night before surgery.  If your care team tells you to take medicine on the morning of surgery, it's okay to take it with a sip of water.  Preventing infection  Shower or bathe the night before and morning of your surgery. Follow the instructions your clinic gave you. (If no instructions, use regular soap.)  Don't shave or clip hair near your surgery site. We'll remove the hair if needed.  Don't smoke or vape the morning of surgery. You may chew nicotine gum up to 2 hours before surgery. A nicotine patch is okay.  Note: Some surgeries require you to completely quit smoking and nicotine. Check with your surgeon.  Your care team will make every effort to keep you safe from infection. We will:  Clean our hands often with soap and water (or an alcohol-based hand rub).  Clean the skin at your surgery site with a special soap that kills germs.  Give you a special gown to keep you warm. (Cold raises the risk of infection.)  Wear special hair covers, masks, gowns and gloves during surgery.  Give antibiotic medicine, if prescribed. Not all surgeries need antibiotics.  What to bring on the day of surgery  Photo ID and insurance card  Copy of your health care directive, if you have one  Glasses and hearing aids  (bring cases)  You can't wear contacts during surgery  Inhaler and eye drops, if you use them (tell us about these when you arrive)  CPAP machine or breathing device, if you use them  A few personal items, if spending the night  If you have . . .  A pacemaker, ICD (cardiac defibrillator) or other implant: Bring the ID card.  An implanted stimulator: Bring the remote control.  A legal guardian: Bring a copy of the certified (court-stamped) guardianship papers.  Please remove any jewelry, including body piercings. Leave jewelry and other valuables at home.  If you're going home the day of surgery  You must have a responsible adult drive you home. They should stay with you overnight as well.  If you don't have someone to stay with you, and you aren't safe to go home alone, we may keep you overnight. Insurance often won't pay for this.  After surgery  If it's hard to control your pain or you need more pain medicine, please call your surgeon's office.  Questions?   If you have any questions for your care team, list them here: _________________________________________________________________________________________________________________________________________________________________________ ____________________________________ ____________________________________ ____________________________________

## 2023-02-06 NOTE — PROGRESS NOTES
[facility]  [address]  [place] [address]  Phone: [phone]  Primary Provider: Cali Goldstein  Pre-op Performing Provider: OLGA RESTREPO      PREOPERATIVE EVALUATION:  Today's date: 2/6/2023    Kilo Joya is a 83 year old male who presents for a preoperative evaluation.    Surgical Information:  Surgery/Procedure: Cataract right eye  Surgery Location: [facility]   Surgeon: Patrick J. Riedel, M.D.  Surgery Date: 2/21/2023  Time of Surgery: TBD  Where patient plans to recover: At home alone  Fax number for surgical facility: [phone]    Type of Anesthesia Anticipated: to be determined    Assessment & Plan     The proposed surgical procedure is considered LOW risk.    Preop general physical exam  Chronic lymphocytic leukemia (H)  Monitoring with PCP.     Parkinson disease (H)  Stable, take med the morning of surgery.     Stage 3b chronic kidney disease (H)  Monitoring with PCP.     Personal history of DVT (deep vein thrombosis)  Low risk surgery.     Cataract, unspecified cataract type, unspecified laterality      High priority for 2019-nCoV vaccine    - COVID-19,PF,PFIZER BOOSTER BIVALENT 12+Yrs    Bilateral cerumen impaction  resolved with water irrigation.        Risks and Recommendations:  The patient has the following additional risks and recommendations for perioperative complications:   - No identified additional risk factors other than previously addressed    Medication Instructions:  Patient is to take all scheduled medications on the day of surgery    RECOMMENDATION:  APPROVAL GIVEN to proceed with proposed procedure, without further diagnostic evaluation.            Subjective     HPI related to upcoming procedure: Patient requiring removal of cataract.     Preop Questions 2/6/2023   1. Have you ever had a heart attack or stroke? No   2. Have you ever had surgery on your heart or blood vessels, such as a stent placement, a coronary  artery bypass, or surgery on an artery in your head, neck, heart, or legs? No   3. Do you have chest pain with activity? No   4. Do you have a history of  heart failure? No   5. Do you currently have a cold, bronchitis or symptoms of other infection? No   6. Do you have a cough, shortness of breath, or wheezing? No   7. Do you or anyone in your family have previous history of blood clots? YES - right arm   8. Do you or does anyone in your family have a serious bleeding problem such as prolonged bleeding following surgeries or cuts? No   9. Have you ever had problems with anemia or been told to take iron pills? No   10. Have you had any abnormal blood loss such as black, tarry or bloody stools? No   11. Have you ever had a blood transfusion? No   12. Are you willing to have a blood transfusion if it is medically needed before, during, or after your surgery? Yes   13. Have you or any of your relatives ever had problems with anesthesia? No   14. Do you have sleep apnea, excessive snoring or daytime drowsiness? No   15. Do you have any artifical heart valves or other implanted medical devices like a pacemaker, defibrillator, or continuous glucose monitor? No   16. Do you have artificial joints? No   17. Are you allergic to latex? No       Health Care Directive:  Patient does not have a Health Care Directive or Living Will: Patient states has Advance Directive and will bring in a copy to clinic.    Preoperative Review of :   reviewed - no record of controlled substances prescribed.          Review of Systems  CONSTITUTIONAL: NEGATIVE for fever, chills, change in weight  INTEGUMENTARY/SKIN: NEGATIVE for worrisome rashes, moles or lesions  ENT/MOUTH: NEGATIVE for ear, mouth and throat problems  RESP: NEGATIVE for significant cough or SOB  CV: NEGATIVE for chest pain, palpitations or peripheral edema  GI: NEGATIVE for nausea, abdominal pain, heartburn, or change in bowel habits  : NEGATIVE for frequency, dysuria, or  hematuria  NEURO: NEGATIVE for weakness, dizziness or paresthesias  HEME: NEGATIVE for bleeding problems  PSYCHIATRIC: NEGATIVE for changes in mood or affect    Patient Active Problem List    Diagnosis Date Noted     Parkinson disease (H) 06/08/2022     Priority: Medium     Personal history of DVT (deep vein thrombosis) 03/14/2022     Priority: Medium     Anemia 12/23/2017     Priority: Medium     Peripheral polyneuropathy 06/02/2017     Priority: Medium     Browning cardiac risk 10-20% in next 10 years 01/31/2017     Priority: Medium     Elevated coronary artery calcium score 03/08/2016     Priority: Medium     Xerosis cutis 12/10/2012     Priority: Medium     Actinic keratoses 12/10/2012     Priority: Medium     Chronic lymphocytic leukemia (H) 07/19/2012     Priority: Medium     Diagnosis updated by automated process. Provider to review and confirm.       CKD (chronic kidney disease) stage 3, GFR 30-59 ml/min (H) 07/09/2012     Priority: Medium     Advanced directives, counseling/discussion 11/16/2011     Priority: Medium     Advance Directive Problem List Overview:   Name Relationship Phone    Primary Health Care Agent            Alternative Health Care Agent          Patient states has Advance Directive and will bring in a copy to clinic. 11/16/2011          Scrotal pain 04/26/2011     Priority: Medium     Hyperlipidemia with target LDL less than 100 12/20/2010     Priority: Medium     Diagnosis updated by automated process. Provider to review and confirm.        Past Medical History:   Diagnosis Date     Ankle fracture, right      CLL (chronic lymphocytic leukaemia)      Left knee pain     takes glucosamine     Shingles      Past Surgical History:   Procedure Laterality Date     CARPAL TUNNEL RELEASE RT/LT      left hand     Current Outpatient Medications   Medication Sig Dispense Refill     budesonide (RINOCORT AQUA) 32 MCG/ACT nasal spray Spray 2 sprays into both nostrils daily 8.43 mL 3     carbamide  "peroxide (DEBROX) 6.5 % otic solution One drop each ear weekly 15 mL 1     carbidopa-levodopa (SINEMET)  MG tablet Take 1 tablet by mouth 3 times daily 90 tablet 0     Multiple Vitamins-Minerals (MULTIVITAMIN OR) Take 1 tablet by mouth daily.       polyethylene glycol-propylene glycol (SYSTANE ULTRA) 0.4-0.3 % SOLN ophthalmic solution Apply 1-2 drops to eye as needed       riTUXimab 100 MG/10ML SOLN          Allergies   Allergen Reactions     Sulfa Drugs Hives and Swelling     Warfarin Rash     Xarelto [Rivaroxaban] Rash        Social History     Tobacco Use     Smoking status: Never     Smokeless tobacco: Never   Substance Use Topics     Alcohol use: Yes     Comment: rarely       History   Drug Use No         Objective     /68 (BP Location: Left arm, Patient Position: Chair, Cuff Size: Adult Regular)   Pulse 89   Temp 98.4  F (36.9  C) (Oral)   Ht 1.689 m (5' 6.5\")   Wt 70.8 kg (156 lb)   SpO2 97%   BMI 24.80 kg/m      Physical Exam    GENERAL APPEARANCE: healthy, alert and no distress     EYES: EOMI,  PERRL     HENT: ear canals and TM's normal and nose and mouth without ulcers or lesions- water irrigation performed for cerumen impaction.      NECK: no adenopathy, no asymmetry, masses, or scars and thyroid normal to palpation     RESP: lungs clear to auscultation - no rales, rhonchi or wheezes     CV: regular rates and rhythm, normal S1 S2, no S3 or S4 and no murmur, click or rub     ABDOMEN:  soft, nontender, no HSM or masses and bowel sounds normal     MS: extremities normal- no gross deformities noted, no evidence of inflammation in joints, FROM in all extremities.     SKIN: no suspicious lesions or rashes     NEURO: Normal strength and tone, sensory exam grossly normal, mentation intact and speech normal     PSYCH: mentation appears normal. and affect normal/bright     LYMPHATICS: No cervical adenopathy    Recent Labs   Lab Test 06/09/22  1803 04/19/21  1326   HGB 11.3* 13.4     --  "    138   POTASSIUM 4.2 4.3   CR 1.57* 2.08*        Diagnostics:  No labs were ordered during this visit.   No EKG required for low risk surgery (cataract, skin procedure, breast biopsy, etc).    Revised Cardiac Risk Index (RCRI):  The patient has the following serious cardiovascular risks for perioperative complications:   - No serious cardiac risks = 0 points     RCRI Interpretation: 0 points: Class I (very low risk - 0.4% complication rate)           Signed Electronically by: Georgia Liang PA-C  Copy of this evaluation report is provided to requesting physician.

## 2023-03-15 ENCOUNTER — TRANSFERRED RECORDS (OUTPATIENT)
Dept: HEALTH INFORMATION MANAGEMENT | Facility: CLINIC | Age: 83
End: 2023-03-15

## 2023-04-16 ENCOUNTER — OFFICE VISIT (OUTPATIENT)
Dept: URGENT CARE | Facility: URGENT CARE | Age: 83
End: 2023-04-16
Payer: MEDICARE

## 2023-04-16 VITALS
HEART RATE: 73 BPM | SYSTOLIC BLOOD PRESSURE: 139 MMHG | BODY MASS INDEX: 25.6 KG/M2 | WEIGHT: 161 LBS | RESPIRATION RATE: 17 BRPM | TEMPERATURE: 97.9 F | DIASTOLIC BLOOD PRESSURE: 80 MMHG | OXYGEN SATURATION: 98 %
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
## 2023-04-16 DIAGNOSIS — Z23 NEED FOR DIPHTHERIA-TETANUS-PERTUSSIS (TDAP) VACCINE: ICD-10-CM

## 2023-04-16 DIAGNOSIS — L03.113 CELLULITIS OF RIGHT UPPER EXTREMITY: ICD-10-CM

## 2023-04-16 PROCEDURE — 90715 TDAP VACCINE 7 YRS/> IM: CPT | Performed by: PHYSICIAN ASSISTANT

## 2023-04-16 PROCEDURE — 90471 IMMUNIZATION ADMIN: CPT | Performed by: PHYSICIAN ASSISTANT

## 2023-04-16 PROCEDURE — 99213 OFFICE O/P EST LOW 20 MIN: CPT | Mod: 25 | Performed by: PHYSICIAN ASSISTANT

## 2023-04-16 NOTE — PATIENT INSTRUCTIONS
Antibiotics as directed- Amoxicillin-clavulanate twice daily for 10 days  Elevate your arm to reduce swelling.  Discussed close evaluation of symptoms.   The affected area was outlined with a skin marker.   Monitor for signs of infection including fever, thick yellow/white discharge, a hard or soft lump under the skin or increasing pain, redness, warmth and/or swelling. If you have any of these or persistent symptoms that are not healing, follow up with your primary care provider.  Follow up with primary care provider with any problems, questions or concerns or if symptoms worsen or fail to improve.

## 2023-04-16 NOTE — PROGRESS NOTES
Assessment & Plan     Dog scratch  - amoxicillin-clavulanate (AUGMENTIN) 875-125 MG tablet; Take 1 tablet by mouth 2 times daily for 10 days    Cellulitis of right upper extremity  - amoxicillin-clavulanate (AUGMENTIN) 875-125 MG tablet; Take 1 tablet by mouth 2 times daily for 10 days    Teatnus updated. Area outlined with skin marker and instructions to monitor given. Go to ED if worsening.    Return in about 1 week (around 4/23/2023) for visit with primary care provider if not improving, ED if worsening.     EVELIO Ross [facility]    Subjective   Kilo Joya is a 83 year old who presents for the following health issues     Patient presents with:  Derm Problem: Dog scratched right arm on Friday afternoon     AMEE Henderson presents clinic today after being scratched by a puppy 2 days ago.  He notes that his arm seems to be getting little bit more red over time.  He does have lymphedema in his right arm is swollen at baseline and this is unchanged from his baseline.  He has a skin tear on his left arm from bumping into the wall, not related to the dog scratch.  His last tetanus vaccine was in 2017, 6 years ago.     Review of Systems   ROS negative except as stated above.      Objective    /80   Pulse 73   Temp 97.9  F (36.6  C) (Tympanic)   Resp 17   Wt 73 kg (161 lb)   SpO2 98%   BMI 25.60 kg/m    Physical Exam   GENERAL: healthy, alert and no distress  NECK: no adenopathy, no asymmetry, masses, or scars and thyroid normal to palpation  RESP: lungs clear to auscultation - no rales, rhonchi or wheezes  CV: regular rate and rhythm, normal S1 S2, no S3 or S4, no murmur, click or rub, no peripheral edema and peripheral pulses strong  SKIN: skin tears with surrounding erythema of right arm, edema in right arm from mid forearm to fingertips. Left forearm with skin tears, no surrounding erythema. Area outlined with skin marker, about 11 cm x 7 cm. No significant excess  warmth, no red streaking or palpable cord.              No results found for any visits on 04/16/23.

## 2023-06-07 ENCOUNTER — TRANSFERRED RECORDS (OUTPATIENT)
Dept: HEALTH INFORMATION MANAGEMENT | Facility: CLINIC | Age: 83
End: 2023-06-07

## 2023-06-26 ENCOUNTER — TRANSFERRED RECORDS (OUTPATIENT)
Dept: HEALTH INFORMATION MANAGEMENT | Facility: CLINIC | Age: 83
End: 2023-06-26
Payer: MEDICARE

## 2023-09-11 ENCOUNTER — TRANSFERRED RECORDS (OUTPATIENT)
Dept: HEALTH INFORMATION MANAGEMENT | Facility: CLINIC | Age: 83
End: 2023-09-11
Payer: MEDICARE

## 2023-10-11 ENCOUNTER — OFFICE VISIT (OUTPATIENT)
Dept: URGENT CARE | Facility: URGENT CARE | Age: 83
End: 2023-10-11
Payer: MEDICARE

## 2023-10-11 VITALS
WEIGHT: 155.2 LBS | HEART RATE: 78 BPM | TEMPERATURE: 97.7 F | RESPIRATION RATE: 24 BRPM | OXYGEN SATURATION: 99 % | DIASTOLIC BLOOD PRESSURE: 62 MMHG | BODY MASS INDEX: 24.67 KG/M2 | SYSTOLIC BLOOD PRESSURE: 158 MMHG

## 2023-10-11 DIAGNOSIS — H61.23 BILATERAL IMPACTED CERUMEN: Primary | ICD-10-CM

## 2023-10-11 PROCEDURE — 69209 REMOVE IMPACTED EAR WAX UNI: CPT | Mod: 50 | Performed by: NURSE PRACTITIONER

## 2023-10-11 NOTE — PROGRESS NOTES
"SUBJECTIVE:  Kilo Joya is a 83 year old male who presents with bilateral ear \"plugged' for 1 month(s).     Past Medical History:   Diagnosis Date    Ankle fracture, right     CLL (chronic lymphocytic leukaemia)     Left knee pain     takes glucosamine    Shingles      Current Outpatient Medications   Medication Sig Dispense Refill    budesonide (RINOCORT AQUA) 32 MCG/ACT nasal spray Spray 2 sprays into both nostrils daily 8.43 mL 3    carbamide peroxide (DEBROX) 6.5 % otic solution One drop each ear weekly (Patient not taking: Reported on 4/16/2023) 15 mL 1    carbidopa-levodopa (SINEMET)  MG tablet Take 1 tablet by mouth 3 times daily 90 tablet 0    Multiple Vitamins-Minerals (MULTIVITAMIN OR) Take 1 tablet by mouth daily.      polyethylene glycol-propylene glycol (SYSTANE ULTRA) 0.4-0.3 % SOLN ophthalmic solution Apply 1-2 drops to eye as needed (Patient not taking: Reported on 4/16/2023)      riTUXimab 100 MG/10ML SOLN        Social History     Tobacco Use    Smoking status: Never    Smokeless tobacco: Never   Substance Use Topics    Alcohol use: Yes     Comment: rarely       ROS:   Review of systems negative except as stated above.    OBJECTIVE:  BP (!) 158/62 (BP Location: Right arm, Cuff Size: Adult Regular)   Pulse 78   Temp 97.7  F (36.5  C) (Tympanic)   Resp 24   Wt 70.4 kg (155 lb 3.2 oz)   SpO2 99%   BMI 24.67 kg/m    EXAM:  Ears bilaterally blocked with wax before lavage  The right TM is normal: no effusions, no erythema, and normal landmarks     The right auditory canal is normal and without drainage, edema or erythema  The left TM is normal: no effusions, no erythema, and normal landmarks  The left auditory canal is normal and without drainage, edema or erythema  Oropharynx exam is normal: no lesions, erythema, adenopathy or exudate.  GENERAL: no acute distress  EYES: EOMI,  PERRL, conjunctiva clear  NECK: supple, non-tender to palpation, no adenopathy noted  RESP: lungs clear to " auscultation - no rales, rhonchi or wheezes  CV: regular rates and rhythm, normal S1 S2, no murmur noted  SKIN: no suspicious lesions or rashes     ASSESSMENT:  (H61.23) Bilateral impacted cerumen  (primary encounter diagnosis)    Plan: OR REMOVAL IMPACTED CERUMEN IRRIGATION/LVG UNILAT  Normal ear after lavage     Santiago to follow up with Primary Care provider regarding elevated blood pressure.      LEO Pretty CNP

## 2023-10-26 NOTE — TELEPHONE ENCOUNTER
[facility] Call Center    Phone Message    May a detailed message be left on voicemail: yes     Reason for Call: The patient stated he wanted to keep his in-clinic visit.  Thank you.     Action Taken: Message routed to:  Adult Clinics: Nephrology p [phone]    Travel Screening: Not Applicable                                                                       Report called to Floor RN. VSS on RA. Patient received 2 mg versed and 50 mcg fentanyl, 50ml benadryl. Right permanent dialysis catheter and right chest tunneled PICC placed

## 2023-12-12 ENCOUNTER — TRANSFERRED RECORDS (OUTPATIENT)
Dept: HEALTH INFORMATION MANAGEMENT | Facility: CLINIC | Age: 83
End: 2023-12-12
Payer: MEDICARE

## 2023-12-28 ENCOUNTER — TRANSFERRED RECORDS (OUTPATIENT)
Dept: HEALTH INFORMATION MANAGEMENT | Facility: CLINIC | Age: 83
End: 2023-12-28
Payer: MEDICARE

## 2024-01-31 DIAGNOSIS — N18.32 STAGE 3B CHRONIC KIDNEY DISEASE (H): Primary | ICD-10-CM

## 2024-02-19 ENCOUNTER — LAB (OUTPATIENT)
Dept: LAB | Facility: CLINIC | Age: 84
End: 2024-02-19
Payer: MEDICARE

## 2024-02-19 ENCOUNTER — OFFICE VISIT (OUTPATIENT)
Dept: NEPHROLOGY | Facility: CLINIC | Age: 84
End: 2024-02-19
Payer: MEDICARE

## 2024-02-19 VITALS
OXYGEN SATURATION: 98 % | DIASTOLIC BLOOD PRESSURE: 77 MMHG | BODY MASS INDEX: 24.28 KG/M2 | HEART RATE: 58 BPM | WEIGHT: 152.7 LBS | SYSTOLIC BLOOD PRESSURE: 132 MMHG

## 2024-02-19 DIAGNOSIS — N18.32 STAGE 3B CHRONIC KIDNEY DISEASE (H): ICD-10-CM

## 2024-02-19 DIAGNOSIS — R33.9 URINARY RETENTION: ICD-10-CM

## 2024-02-19 DIAGNOSIS — C91.10 CHRONIC LYMPHOCYTIC LEUKEMIA (H): ICD-10-CM

## 2024-02-19 DIAGNOSIS — I49.9 IRREGULAR HEART RHYTHM: Primary | ICD-10-CM

## 2024-02-19 DIAGNOSIS — G20.A1 PARKINSON'S DISEASE, UNSPECIFIED WHETHER DYSKINESIA PRESENT, UNSPECIFIED WHETHER MANIFESTATIONS FLUCTUATE (H): ICD-10-CM

## 2024-02-19 LAB
ALBUMIN MFR UR ELPH: 7.9 MG/DL
ALBUMIN SERPL BCG-MCNC: 4 G/DL (ref 3.5–5.2)
ANION GAP SERPL CALCULATED.3IONS-SCNC: 9 MMOL/L (ref 7–15)
BUN SERPL-MCNC: 39.2 MG/DL (ref 8–23)
CALCIUM SERPL-MCNC: 9.2 MG/DL (ref 8.8–10.2)
CHLORIDE SERPL-SCNC: 104 MMOL/L (ref 98–107)
CREAT SERPL-MCNC: 1.59 MG/DL (ref 0.67–1.17)
CREAT UR-MCNC: 96.2 MG/DL
DEPRECATED HCO3 PLAS-SCNC: 25 MMOL/L (ref 22–29)
EGFRCR SERPLBLD CKD-EPI 2021: 43 ML/MIN/1.73M2
ERYTHROCYTE [DISTWIDTH] IN BLOOD BY AUTOMATED COUNT: 12.7 % (ref 10–15)
GLUCOSE SERPL-MCNC: 95 MG/DL (ref 70–99)
HCT VFR BLD AUTO: 37.6 % (ref 40–53)
HGB BLD-MCNC: 12.3 G/DL (ref 13.3–17.7)
MCH RBC QN AUTO: 31.7 PG (ref 26.5–33)
MCHC RBC AUTO-ENTMCNC: 32.7 G/DL (ref 31.5–36.5)
MCV RBC AUTO: 97 FL (ref 78–100)
PHOSPHATE SERPL-MCNC: 3 MG/DL (ref 2.5–4.5)
PLATELET # BLD AUTO: 234 10E3/UL (ref 150–450)
POTASSIUM SERPL-SCNC: 4.3 MMOL/L (ref 3.4–5.3)
PROT/CREAT 24H UR: 0.08 MG/MG CR (ref 0–0.2)
PTH-INTACT SERPL-MCNC: 27 PG/ML (ref 15–65)
RBC # BLD AUTO: 3.88 10E6/UL (ref 4.4–5.9)
SODIUM SERPL-SCNC: 138 MMOL/L (ref 135–145)
VIT D+METAB SERPL-MCNC: 44 NG/ML (ref 20–50)
WBC # BLD AUTO: 9.1 10E3/UL (ref 4–11)

## 2024-02-19 PROCEDURE — 84156 ASSAY OF PROTEIN URINE: CPT

## 2024-02-19 PROCEDURE — 80069 RENAL FUNCTION PANEL: CPT

## 2024-02-19 PROCEDURE — 93000 ELECTROCARDIOGRAM COMPLETE: CPT | Performed by: INTERNAL MEDICINE

## 2024-02-19 PROCEDURE — 82306 VITAMIN D 25 HYDROXY: CPT

## 2024-02-19 PROCEDURE — 99214 OFFICE O/P EST MOD 30 MIN: CPT | Mod: 25 | Performed by: INTERNAL MEDICINE

## 2024-02-19 PROCEDURE — 83970 ASSAY OF PARATHORMONE: CPT

## 2024-02-19 PROCEDURE — 36415 COLL VENOUS BLD VENIPUNCTURE: CPT

## 2024-02-19 PROCEDURE — 85027 COMPLETE CBC AUTOMATED: CPT

## 2024-02-19 NOTE — PATIENT INSTRUCTIONS
EKG today  Recommend appointment with primary care  I will reach out regarding the potential of restarting flomax.   Recommend labs in 6 months  Follow-up in one year with labs prior

## 2024-02-19 NOTE — NURSING NOTE
Kilo Joya's goals for this visit include:   Chief Complaint   Patient presents with    RECHECK     1 year follow up CKD        He requests these members of his care team be copied on today's visit information: yes     PCP: Cali Goldstein    Referring Provider:  No referring provider defined for this encounter.    /77 (BP Location: Left arm, Patient Position: Chair, Cuff Size: Adult Regular)   Pulse 58   Wt 69.3 kg (152 lb 11.2 oz)   SpO2 98%   BMI 24.28 kg/m      Do you need any medication refills at today's visit? No     HELEN Barraza   Neph/Pulm [facility]

## 2024-02-26 NOTE — PROGRESS NOTES
2/19/24  HPI :  Derick Joya is a 84 year old  male who presents for follow-up of CKD.  On review of risk factors for CKD, his work did expose him to various solutions such as methanol, acetone, and various metals at times. No other significant risk factors. He is followed by Dr. Robles at [facility] for his CLL but has not had treatment so far, but his counts are increasing with increasing fatigue. - just following for now. Creatinine has been 1.43-1.67 in the past year; 1.67 recently; no h/o proteinuria. BP at goal. He has no pitting edema - swelling is always better in the AM - he wears TEDs for his mild edema concerns. He denies burning or pain with urination but has trouble urinating, incompletely empty the bladder and also endorse nocturia. Endorsed 20 pound weight loss in past 18 months since his wife passed away (also decreased PO intake). He is trying to eat better now. Denied other systemic symptoms including fever/chills, nausea/vomiting, abdominal pain, chest pain or SOB, dizziness/lightheadedness.     Not checking BP at home, last checked at onc clinic which showed BP of 130/56 on 10/9/2020.     - History of Hematuria: no  - Swelling: present, all the time wear compression stockings, may be a little bit worse with out stockings also get worse with walking, which is little more than before. Broke right ankle 35 years ago, so likely had more swelling around right ankle vs left one. Denied pain/redness in LE.   - Hx of UTIs: no   - Hx of stones: no   - Rashes/Joint pain: no   - Family hx of kidney disease:  paternal aunt had renal disease but does not know the disease   - NSAID use: no    4/19/21: Much variability of kidney function recently. He received bendamustine and rituximab on 11/12/20 - difficulty with fatigue, decreased appetite and thrombus. Eating and drinking well now. No NSAID use.  He is off of ASA - was on 325 mg daily daily for at least a month. He reports feeling sick for 2 weeks post  bendamustine. He was eating but felt terrible; no diarrhea. He spent 1/2 his time in bed; trying to stay hydrated. Creatianine has been 1.79-2.08 since March.     2/19/24: in person visit. No home readings. No lightheadedness. Eating and drinking ok. He is up to urinate 2-3 times at night. He reports that at times he has difficulty getting urine output. Last year had cataract repair, covid last year, [place] dx. He exercises daily.      carbidopa-levodopa (SINEMET)  MG tablet, Take 1 tablet by mouth 3 times daily  Multiple Vitamins-Minerals (MULTIVITAMIN OR), Take 1 tablet by mouth daily.    No current facility-administered medications on file prior to visit.      Exam:  /77 (BP Location: Left arm, Patient Position: Chair, Cuff Size: Adult Regular)   Pulse 58   Wt 69.3 kg (152 lb 11.2 oz)   SpO2 98%   BMI 24.28 kg/m    GENERAL APPEARANCE: alert and no distress  CV - irregular rhythm    Results:    Lab on 02/19/2024   Component Date Value Ref Range Status    Vitamin D, Total (25-Hydroxy) 02/19/2024 44  20 - 50 ng/mL Final    optimum levels    Parathyroid Hormone Intact 02/19/2024 27  15 - 65 pg/mL Final    Total Protein Urine mg/dL 02/19/2024 7.9    mg/dL Final    The reference ranges have not been established in urine protein. The results should be integrated into the clinical context for interpretation.    Total Protein Urine mg/mg Creat 02/19/2024 0.08  0.00 - 0.20 mg/mg Cr Final    Creatinine Urine mg/dL 02/19/2024 96.2  mg/dL Final    The reference ranges have not been established in urine creatinine. The results should be integrated into the clinical context for interpretation.    WBC Count 02/19/2024 9.1  4.0 - 11.0 10e3/uL Final    RBC Count 02/19/2024 3.88 (L)  4.40 - 5.90 10e6/uL Final    Hemoglobin 02/19/2024 12.3 (L)  13.3 - 17.7 g/dL Final    Hematocrit 02/19/2024 37.6 (L)  40.0 - 53.0 % Final    MCV 02/19/2024 97  78 - 100 fL Final    MCH 02/19/2024 31.7  26.5 - 33.0 pg Final    MCHC  02/19/2024 32.7  31.5 - 36.5 g/dL Final    RDW 02/19/2024 12.7  10.0 - 15.0 % Final    Platelet Count 02/19/2024 234  150 - 450 10e3/uL Final    Sodium 02/19/2024 138  135 - 145 mmol/L Final    Reference intervals for this test were updated on 09/26/2023 to more accurately reflect our healthy population. There may be differences in the flagging of prior results with similar values performed with this method. Interpretation of those prior results can be made in the context of the updated reference intervals.     Potassium 02/19/2024 4.3  3.4 - 5.3 mmol/L Final    Chloride 02/19/2024 104  98 - 107 mmol/L Final    Carbon Dioxide (CO2) 02/19/2024 25  22 - 29 mmol/L Final    Anion Gap 02/19/2024 9  7 - 15 mmol/L Final    Glucose 02/19/2024 95  70 - 99 mg/dL Final    Urea Nitrogen 02/19/2024 39.2 (H)  8.0 - 23.0 mg/dL Final    Creatinine 02/19/2024 1.59 (H)  0.67 - 1.17 mg/dL Final    GFR Estimate 02/19/2024 43 (L)  >60 mL/min/1.73m2 Final    Calcium 02/19/2024 9.2  8.8 - 10.2 mg/dL Final    Albumin 02/19/2024 4.0  3.5 - 5.2 g/dL Final    Phosphorus 02/19/2024 3.0  2.5 - 4.5 mg/dL Final         Lab results were reviewed and interpreted.   Outside lab results:  8/20/20: 1.67  Nov 2020: 1.55  Dec 2020: 1.84  Jan 2021: 1.7  March 21,2021: 2  March 31, 2021: 1.79  April 2021: 2.08  April 1, 2021: 1.7    Assessment/Plan:   1. CKD Stage 3: He does not have an obvious etiology of his CKD. He does have a hx of possible exposure to various metals in the past - unclear whether he had lead or other metal exposure causing kidney injury in the past. He has not had any evidence of hematuria/proteinuria.    Creatinine is actually slightly improved from where he had been years ago. Likely with LONNY in 2021 at the time of his treatment.      2. CLL: followed by Dr. Robles at [facility]      3. Difficulty with urination: he is not on flomax at this time but with some urinary complaints. Unclear why it was stopped as I recommended retrialing  it at the time of the last visit.     4. Irregular rhythm: EKG obtained today to rule out Afib- PVCs noted.     Patient Instructions   EKG today  Recommend appointment with primary care  I will reach out regarding the potential of restarting flomax.   Recommend labs in 6 months  Follow-up in one year with labs prior       Anjana Carpio DO

## 2024-03-11 ENCOUNTER — OFFICE VISIT (OUTPATIENT)
Dept: FAMILY MEDICINE | Facility: CLINIC | Age: 84
End: 2024-03-11
Payer: MEDICARE

## 2024-03-11 VITALS
WEIGHT: 155.8 LBS | TEMPERATURE: 98.4 F | BODY MASS INDEX: 24.45 KG/M2 | OXYGEN SATURATION: 98 % | SYSTOLIC BLOOD PRESSURE: 132 MMHG | DIASTOLIC BLOOD PRESSURE: 72 MMHG | HEART RATE: 76 BPM | HEIGHT: 67 IN | RESPIRATION RATE: 20 BRPM

## 2024-03-11 DIAGNOSIS — G20.A1 PARKINSON'S DISEASE, UNSPECIFIED WHETHER DYSKINESIA PRESENT, UNSPECIFIED WHETHER MANIFESTATIONS FLUCTUATE (H): ICD-10-CM

## 2024-03-11 DIAGNOSIS — R00.2 PALPITATIONS: ICD-10-CM

## 2024-03-11 DIAGNOSIS — N18.30 STAGE 3 CHRONIC KIDNEY DISEASE, UNSPECIFIED WHETHER STAGE 3A OR 3B CKD (H): ICD-10-CM

## 2024-03-11 DIAGNOSIS — R35.1 NOCTURIA: Primary | ICD-10-CM

## 2024-03-11 DIAGNOSIS — C91.10 CHRONIC LYMPHOCYTIC LEUKEMIA (H): ICD-10-CM

## 2024-03-11 DIAGNOSIS — E78.5 HYPERLIPIDEMIA WITH TARGET LDL LESS THAN 100: ICD-10-CM

## 2024-03-11 PROCEDURE — 99214 OFFICE O/P EST MOD 30 MIN: CPT | Performed by: FAMILY MEDICINE

## 2024-03-11 RX ORDER — RESPIRATORY SYNCYTIAL VIRUS VACCINE 120MCG/0.5
0.5 KIT INTRAMUSCULAR ONCE
Qty: 1 EACH | Refills: 0 | Status: CANCELLED | OUTPATIENT
Start: 2024-03-11 | End: 2024-03-11

## 2024-03-11 RX ORDER — TERAZOSIN 1 MG/1
1 CAPSULE ORAL AT BEDTIME
Qty: 90 CAPSULE | Refills: 1 | Status: SHIPPED | OUTPATIENT
Start: 2024-03-11 | End: 2024-03-15 | Stop reason: SINTOL

## 2024-03-11 ASSESSMENT — PAIN SCALES - GENERAL: PAINLEVEL: NO PAIN (0)

## 2024-03-11 NOTE — PROGRESS NOTES
ASSESSMENT / PLAN:  (R35.1) Nocturia  (primary encounter diagnosis)  Comment: needs help. Was on flomax in past  Plan: terazosin (HYTRIN) 1 MG capsule        Will try hytrin at lowest dosage but can make LIGHTHEADED. Over the counter saw palmetto too. Urology if worse. Reveiwed risks and side effects of medication  Expected course and warning signs reviewed. Limit water/fliuids close to bedtime    (R00.2) Palpitations  Comment: rare/mild.   Plan: patient will continue monitor. Work on deep breathing and limit caffeine. To ER if prolonged/ LIGHTHEADED/ chest pain or shortness of breath. Cardiology/echo if worsening. Expected course and warning signs reviewed. Call/email with questions/concerns  Continue exercise.     (E78.5) Hyperlipidemia with target LDL less than 100  Plan: wellness exam with future fasitng lipids in summer    (G20.A1) Parkinson's disease, unspecified whether dyskinesia present, unspecified whether manifestations fluctuate    Plan: per neurology    (N18.30) Stage 3 chronic kidney disease, unspecified whether stage 3a or 3b CKD (H)  Comment: stable  Plan: per nephrology    (C91.10) Chronic lymphocytic leukemia (H)  Comment: stable  Plan: per oncology.       Osvaldo Henderson is a 84 year old, presenting for the following health issues:      History parkinsons - seeing neurology, CLL-seeing oncology, anemia and CRI- seeing nephrology.   Normal EKG one month ago  Was on flomax in past for bph.   Working out regularly without chest pain or shortness of breath.   Daily.   [place] [facility] [place] [facility] [place]  [facility]. 6 grandkids  One cup coffee in AM.   No major snoring or kavita noted. Wife not concerns.  No nausea, vomiting or diarrhea or black/bloody stools.   No ALCOHOL regularly.   Nocturia x2-3.   Limited   Establish Care (Irregular heart rate, Check kidneys)      3/11/2024     2:48 PM   Additional Questions   Roomed by HAILEY Mar CMA   Accompanied by Son- Flaquito     History of  "Present Illness       CKD: He is not using over the counter pain medicine.     He eats 0-1 servings of fruits and vegetables daily.He consumes 1 sweetened beverage(s) daily.He exercises with enough effort to increase his heart rate 20 to 29 minutes per day.  He exercises with enough effort to increase his heart rate 7 days per week.   He is taking medications regularly.           Objective    /72   Pulse 76   Temp 98.4  F (36.9  C) (Oral)   Resp 20   Ht 1.702 m (5' 7\")   Wt 70.7 kg (155 lb 12.8 oz)   SpO2 98%   BMI 24.40 kg/m     Physical Exam   GENERAL: alert and no distress  EYES: Eyes grossly normal to inspection, PERRL and conjunctivae and sclerae normal  HENT: ear canals and TM's normal, nose and mouth without ulcers or lesions  NECK: no adenopathy, no asymmetry, masses, or scars  RESP: lungs clear to auscultation - no rales, rhonchi or wheezes  CV: regular rate and rhythm, normal S1 S2, no S3 or S4, no murmur, click or rub, no peripheral edema   ABDOMEN: soft, nontender, no hepatosplenomegaly, no masses and bowel sounds normal  MS: no gross musculoskeletal defects noted, no edema  NEURO: grossly non-focal. Slow movements  PSYCH: mentation appears normal, affect normal/bright            Signed Electronically by: Lukas Robles MD    "

## 2024-03-15 ENCOUNTER — TELEPHONE (OUTPATIENT)
Dept: FAMILY MEDICINE | Facility: CLINIC | Age: 84
End: 2024-03-15
Payer: MEDICARE

## 2024-03-15 DIAGNOSIS — R35.1 NOCTURIA: ICD-10-CM

## 2024-03-15 NOTE — TELEPHONE ENCOUNTER
"Incoming call from pt reporting he believes he is having low BP and dizziness/lightheadedness as a result of starting terazosin. Please advise.    Pt was \"pretty lightheaded a couple of times this morning.\" Pt states he has tried to be extra careful and go really slow and staying with his walker.    Pt checked his BP today after breakfast and states BP was 103/69, HR 77.    Pt states his \"hand and leg have been more jumpy today,\" and is concerned the terazosin is worsening his Parkinsons or interfering with his medications.    Pt states the terazosin did seem to be helping with the nocturia stating he only got up to urinate at night twice for the last 2 nights, instead of 3-4 times.      Per 3/11/24 office visit:  ASSESSMENT / PLAN:  (R35.1) Nocturia  (primary encounter diagnosis)  Comment: needs help. Was on flomax in past  Plan: terazosin (HYTRIN) 1 MG capsule        Will try hytrin at lowest dosage but can make LIGHTHEADED. Over the counter saw palmetto too. Urology if worse. Reveiwed risks and side effects of medication.  Expected course and warning signs reviewed. Limit water/fliuids close to bedtime.    ARMINDA AbernathyN, RN    "

## 2024-03-15 NOTE — TELEPHONE ENCOUNTER
RN returned call to pt and notified pt via phone of provider 3/15/2024  2:19 PM message below as written.    Pt states he did purchase OTC saw palmetto.    RN provided pt with Urology scheduling phone number [phone].    Pt indicates understanding of issues and agrees with the plan.    ARMINDA AbernathyN, RN

## 2024-03-15 NOTE — TELEPHONE ENCOUNTER
Stop hytrin and try over the counter saw palmetto. Referral placed for urology follow-up too. Please give #. Lukas Robles MD

## 2024-03-15 NOTE — TELEPHONE ENCOUNTER
Drug Change Request      What is the current medication?:  N/A    What alternative is being requested?: Pt would like to get off all together    Why the request to change?:  Pt thinks it may be interfering with Parkinsons medication as he is having dizzy spells    Preferred Pharmacy:   [id number] [place] [facility] - [place], [place] - [address] [facility]  [address] [facility] [address]  Phone: [phone] Fax: [phone]      Okay to leave a detailed message?: Yes at Home number on file [phone] (home)

## 2024-04-17 ENCOUNTER — TELEPHONE (OUTPATIENT)
Dept: FAMILY MEDICINE | Facility: CLINIC | Age: 84
End: 2024-04-17
Payer: MEDICARE

## 2024-04-17 NOTE — LETTER
April 17, 2024    To  Kilo Joya  [address]    [place] [address]    Your team at [facility] cares about your health. We have reviewed your chart and based on our findings; we are making the following recommendations to better manage your health.     You are in particular need of attention regarding the following:     PREVENTATIVE VISIT: Annual Medicare Wellness:Schedule an Annual Medicare Wellness Exam. Please call your [facility] clinic to set up your appointment.  Please schedule a Nurse Only Appointment with your primary care clinic to update your immunizations that are due.    If you have already completed these items, please contact the clinic via phone or   MyChart so your care team can review and update your records. Thank you for   choosing [facility] Clinics for your healthcare needs. For any questions,   concerns, or to schedule an appointment please contact our clinic.    Healthy Regards,      Your [facility] Care Team

## 2024-04-17 NOTE — TELEPHONE ENCOUNTER
Patient Quality Outreach    Patient is due for the following:   Physical Annual Wellness Visit      Topic Date Due    Zoster (Shingles) Vaccine (1 of 2) Never done    Flu Vaccine (1) 09/01/2023    COVID-19 Vaccine (5 - 2023-24 season) 09/01/2023       Next Steps:   Schedule a Annual Wellness Visit    Type of outreach:    Sent letter.      Questions for provider review:    None           Mayra Hernadez CMA  Chart routed to Care Team.

## 2024-04-23 ENCOUNTER — TRANSFERRED RECORDS (OUTPATIENT)
Dept: HEALTH INFORMATION MANAGEMENT | Facility: CLINIC | Age: 84
End: 2024-04-23

## 2024-08-07 ENCOUNTER — DOCUMENTATION ONLY (OUTPATIENT)
Dept: LAB | Facility: CLINIC | Age: 84
End: 2024-08-07
Payer: MEDICARE

## 2024-08-07 DIAGNOSIS — N18.32 STAGE 3B CHRONIC KIDNEY DISEASE (H): Primary | ICD-10-CM

## 2024-08-07 DIAGNOSIS — E78.5 HYPERLIPIDEMIA WITH TARGET LDL LESS THAN 100: Primary | ICD-10-CM

## 2024-08-07 DIAGNOSIS — D64.9 ANEMIA, UNSPECIFIED TYPE: ICD-10-CM

## 2024-08-07 NOTE — PROGRESS NOTES
Kilo DENA Joya has an upcoming lab appointment:    Future Appointments   Date Time Provider Department Center   8/19/2024 10:00 AM AN LAB [facility]   8/27/2024  8:00 AM Lukas Robles MD [facility]   2/17/2025  3:30 [facility]   2/17/2025  4:00 PM Anjana Carpio DO [facility]         There is no order available. Please review and place either future orders or HMPO (Review of Health Maintenance Protocol Orders), as appropriate.    Health Maintenance Due   Topic    ANNUAL REVIEW OF HM ORDERS     LIPID     MICROALBUMIN      Renita Alejandre

## 2024-08-07 NOTE — PROGRESS NOTES
Kilo DENA Joya has an upcoming lab appointment:    Future Appointments   Date Time Provider Department Center   8/19/2024 10:00 AM AN LAB [facility]   8/27/2024  8:00 AM Lukas Robles MD [facility]   2/17/2025  3:30 [facility]   2/17/2025  4:00 PM Anjana Carpio DO [facility]         There is no order available. Please review and place either future orders or HMPO (Review of Health Maintenance Protocol Orders), as appropriate.    Health Maintenance Due   Topic    ANNUAL REVIEW OF HM ORDERS     LIPID     MICROALBUMIN      Renita Alejandre

## 2024-10-23 ENCOUNTER — TRANSFERRED RECORDS (OUTPATIENT)
Dept: HEALTH INFORMATION MANAGEMENT | Facility: CLINIC | Age: 84
End: 2024-10-23
Payer: MEDICARE

## 2025-04-22 ENCOUNTER — PATIENT OUTREACH (OUTPATIENT)
Dept: FAMILY MEDICINE | Facility: CLINIC | Age: 85
End: 2025-04-22
Payer: MEDICARE

## 2025-04-23 ENCOUNTER — TRANSFERRED RECORDS (OUTPATIENT)
Dept: HEALTH INFORMATION MANAGEMENT | Facility: CLINIC | Age: 85
End: 2025-04-23
Payer: MEDICARE

## 2025-07-29 PROBLEM — Z91.89 FRAMINGHAM CARDIAC RISK 10-20% IN NEXT 10 YEARS: Status: RESOLVED | Noted: 2017-01-31 | Resolved: 2025-07-29

## 2025-08-21 ENCOUNTER — TRANSFERRED RECORDS (OUTPATIENT)
Dept: HEALTH INFORMATION MANAGEMENT | Facility: CLINIC | Age: 85
End: 2025-08-21
Payer: MEDICARE